# Patient Record
Sex: FEMALE | Race: WHITE | NOT HISPANIC OR LATINO | Employment: OTHER | URBAN - METROPOLITAN AREA
[De-identification: names, ages, dates, MRNs, and addresses within clinical notes are randomized per-mention and may not be internally consistent; named-entity substitution may affect disease eponyms.]

---

## 2017-02-10 ENCOUNTER — OFFICE VISIT (OUTPATIENT)
Dept: FAMILY MEDICINE | Facility: CLINIC | Age: 82
End: 2017-02-10
Payer: MEDICARE

## 2017-02-10 VITALS
HEIGHT: 59 IN | SYSTOLIC BLOOD PRESSURE: 132 MMHG | WEIGHT: 115.75 LBS | HEART RATE: 66 BPM | OXYGEN SATURATION: 96 % | BODY MASS INDEX: 23.33 KG/M2 | TEMPERATURE: 98 F | DIASTOLIC BLOOD PRESSURE: 74 MMHG

## 2017-02-10 DIAGNOSIS — R05.9 COUGH: Primary | ICD-10-CM

## 2017-02-10 DIAGNOSIS — J06.9 UPPER RESPIRATORY TRACT INFECTION, UNSPECIFIED TYPE: ICD-10-CM

## 2017-02-10 PROCEDURE — 96372 THER/PROPH/DIAG INJ SC/IM: CPT | Mod: S$GLB,,, | Performed by: NURSE PRACTITIONER

## 2017-02-10 PROCEDURE — 99213 OFFICE O/P EST LOW 20 MIN: CPT | Mod: 25,S$GLB,, | Performed by: NURSE PRACTITIONER

## 2017-02-10 RX ORDER — TRIAMCINOLONE ACETONIDE 40 MG/ML
80 INJECTION, SUSPENSION INTRA-ARTICULAR; INTRAMUSCULAR
Status: COMPLETED | OUTPATIENT
Start: 2017-02-10 | End: 2017-02-10

## 2017-02-10 RX ORDER — BENZONATATE 200 MG/1
200 CAPSULE ORAL 3 TIMES DAILY PRN
Qty: 30 CAPSULE | Refills: 0 | Status: SHIPPED | OUTPATIENT
Start: 2017-02-10 | End: 2017-02-20

## 2017-02-10 RX ADMIN — TRIAMCINOLONE ACETONIDE 80 MG: 40 INJECTION, SUSPENSION INTRA-ARTICULAR; INTRAMUSCULAR at 04:02

## 2017-02-10 NOTE — MR AVS SNAPSHOT
[facility]  [address] [place] [address]  Phone: [phone]  Fax: [phone]                  Colleen Bethea   2/10/2017 3:00 PM   Office Visit    Description:  Female : 1932   Provider:  Deloris Gaston NP   Department:  [facility]           Reason for Visit     Cough           Diagnoses this Visit        Comments    Cough    -  Primary     Upper respiratory tract infection, unspecified type                To Do List           Goals (5 Years of Data)     None       These Medications        Disp Refills Start End    benzonatate (TESSALON) 200 MG capsule 30 capsule 0 2/10/2017 2017    Take 1 capsule (200 mg total) by mouth 3 (three) times daily as needed for Cough. - Oral    Pharmacy: [facility]/pharmacy #[id number] - [address] AT [facility] #: [phone]         [facility]     [facility] On Call Nurse [facility] -  Assistance  Registered nurses in the [facility] On Call Center provide clinical advisement, health education, appointment booking, and other advisory services.  Call for this free service at [phone].             Medications           Message regarding Medications     Verify the changes and/or additions to your medication regime listed below are the same as discussed with your clinician today.  If any of these changes or additions are incorrect, please notify your healthcare provider.        START taking these NEW medications        Refills    benzonatate (TESSALON) 200 MG capsule 0    Sig: Take 1 capsule (200 mg total) by mouth 3 (three) times daily as needed for Cough.    Class: Normal    Route: Oral      These medications were administered today        Dose Freq    triamcinolone acetonide injection 80 mg 80 mg Clinic/HOD 1 time    Sig: Inject 2 mLs (80 mg total) into the muscle one time.    Class: Normal    Route: Intramuscular           Verify that the below list of medications is an  "accurate representation of the medications you are currently taking.  If none reported, the list may be blank. If incorrect, please contact your healthcare provider. Carry this list with you in case of emergency.           Current Medications     alendronate (FOSAMAX) 70 MG tablet Take 1 tablet (70 mg total) by mouth every 7 days.    apixaban (ELIQUIS) 5 mg Tab Take by mouth 2 (two) times daily.    losartan (COZAAR) 25 MG tablet TAKE 1 TABLET (25 MG TOTAL) BY MOUTH ONCE DAILY.    methocarbamol (ROBAXIN) 500 MG Tab TAKE 1 TABLET (500 MG TOTAL) BY MOUTH 2 (TWO) TIMES DAILY AS NEEDED (MUSCLE PAIN).    metoprolol tartrate (LOPRESSOR) 50 MG tablet Take 50 mg by mouth 2 (two) times daily.    benzonatate (TESSALON) 200 MG capsule Take 1 capsule (200 mg total) by mouth 3 (three) times daily as needed for Cough.           Clinical Reference Information           Your Vitals Were     BP Pulse Temp Height Weight SpO2    132/74 66 98.3 °F (36.8 °C) (Oral) 4' 11" (1.499 m) 52.5 kg (115 lb 11.9 oz) 96%    BMI                23.38 kg/m2          Blood Pressure          Most Recent Value    BP  132/74      Allergies as of 2/10/2017     No Known Allergies      Immunizations Administered on Date of Encounter - 2/10/2017     None      [facility] Sign-Up     Activating your [facility] account is as easy as 1-2-3!     1) Visit [email or web address], select Sign Up Now, enter this activation code and your date of birth, then select Next.  [email or web address]  Expires: 3/27/2017  3:30 PM      2) Create a username and password to use when you visit [facility] in the future and select a security question in case you lose your password and select Next.    3) Enter your e-mail address and click Sign Up!    Additional Information  If you have questions, please e-mail [email or web address] or call [phone] to talk to our [facility] staff. Remember, [facility] is NOT to be used for urgent needs. For medical emergencies, dial 911.         Language " Assistance Services     ATTENTION: Language assistance services are available, free of charge. Please call [phone].      ATENCIÓN: Si abdifatahla fermin, tiene a cam disposición servicios gratuitos de asistencia lingüística. Llame al [phone].     CHÚ Ý: N?u b?n nói Ti?ng Vi?t, có các d?ch v? h? tr? ngôn ng? mi?n phí dành cho b?n. G?i s? [phone].         [facility] complies with applicable Federal civil rights laws and does not discriminate on the basis of race, color, national origin, age, disability, or sex.

## 2017-02-10 NOTE — PROGRESS NOTES
Subjective:       Patient ID: Colleen Bethea is a 84 y.o. female.    Chief Complaint: Cough    Cough   This is a new problem. The current episode started in the past 7 days. The problem has been unchanged. The problem occurs every few minutes. The cough is non-productive. Associated symptoms include nasal congestion and postnasal drip. Pertinent negatives include no chest pain, chills, ear congestion, ear pain, fever, headaches, heartburn, hemoptysis, myalgias, rash, rhinorrhea, sore throat, shortness of breath, sweats, weight loss or wheezing. Treatments tried: cough drops. The treatment provided mild relief. There is no history of asthma, bronchiectasis, bronchitis, COPD, emphysema, environmental allergies or pneumonia.     Review of Systems   Constitutional: Negative for chills, diaphoresis, fatigue, fever and weight loss.   HENT: Positive for postnasal drip. Negative for ear pain, nosebleeds, rhinorrhea, sore throat and voice change.    Respiratory: Positive for cough. Negative for hemoptysis, chest tightness, shortness of breath and wheezing.    Cardiovascular: Negative for chest pain, palpitations and leg swelling.   Gastrointestinal: Negative for heartburn.   Musculoskeletal: Negative for myalgias.   Skin: Negative for rash.   Allergic/Immunologic: Negative for environmental allergies.   Neurological: Negative for headaches.       Objective:      Physical Exam   Constitutional: She appears well-developed and well-nourished. No distress.   HENT:   Right Ear: Tympanic membrane and external ear normal.   Left Ear: Tympanic membrane and external ear normal.   Nose: No rhinorrhea. Right sinus exhibits no maxillary sinus tenderness and no frontal sinus tenderness. Left sinus exhibits no maxillary sinus tenderness and no frontal sinus tenderness.   Mouth/Throat: No oropharyngeal exudate, posterior oropharyngeal edema or posterior oropharyngeal erythema.   Cardiovascular: Normal rate, regular rhythm and normal  heart sounds.    Pulmonary/Chest: Effort normal and breath sounds normal.   Skin: Skin is warm and dry. She is not diaphoretic.   Psychiatric: She has a normal mood and affect. Her behavior is normal. Judgment and thought content normal.   Vitals reviewed.      Assessment:       1. Cough    2. Upper respiratory tract infection, unspecified type        Plan:       Cough    Upper respiratory tract infection, unspecified type    Other orders  -     triamcinolone acetonide injection 80 mg; Inject 2 mLs (80 mg total) into the muscle one time.  -     benzonatate (TESSALON) 200 MG capsule; Take 1 capsule (200 mg total) by mouth 3 (three) times daily as needed for Cough.  Dispense: 30 capsule; Refill: 0

## 2017-03-17 ENCOUNTER — TELEPHONE (OUTPATIENT)
Dept: FAMILY MEDICINE | Facility: CLINIC | Age: 82
End: 2017-03-17

## 2017-03-17 NOTE — TELEPHONE ENCOUNTER
Faxed medical clearance form received from dr pruett's office  Pt needs to rtc to see dr bucio for this clearance  Pt saw brayan 2/10/17 for a cold not clearance  i tried to call the pt to set up apt but no answer and unable to leave a message  i did fax this info back to dr pruett office notifying them that pt needs to rtc

## 2017-03-26 RX ORDER — LOSARTAN POTASSIUM 25 MG/1
TABLET ORAL
Qty: 30 TABLET | Refills: 11 | Status: SHIPPED | OUTPATIENT
Start: 2017-03-26 | End: 2018-03-22 | Stop reason: SDUPTHER

## 2017-05-19 ENCOUNTER — OFFICE VISIT (OUTPATIENT)
Dept: FAMILY MEDICINE | Facility: CLINIC | Age: 82
End: 2017-05-19
Payer: MEDICARE

## 2017-05-19 ENCOUNTER — TELEPHONE (OUTPATIENT)
Dept: FAMILY MEDICINE | Facility: CLINIC | Age: 82
End: 2017-05-19

## 2017-05-19 VITALS
HEIGHT: 59 IN | OXYGEN SATURATION: 98 % | TEMPERATURE: 97 F | WEIGHT: 115.94 LBS | HEART RATE: 66 BPM | DIASTOLIC BLOOD PRESSURE: 72 MMHG | BODY MASS INDEX: 23.37 KG/M2 | SYSTOLIC BLOOD PRESSURE: 128 MMHG

## 2017-05-19 DIAGNOSIS — Z00.00 ROUTINE ADULT HEALTH MAINTENANCE: ICD-10-CM

## 2017-05-19 DIAGNOSIS — R04.0 EPISTAXIS, RECURRENT: Primary | ICD-10-CM

## 2017-05-19 DIAGNOSIS — Z23 NEED FOR PNEUMOCOCCAL VACCINATION: ICD-10-CM

## 2017-05-19 DIAGNOSIS — Z86.73 HX-TIA (TRANSIENT ISCHEMIC ATTACK): ICD-10-CM

## 2017-05-19 PROCEDURE — 90670 PCV13 VACCINE IM: CPT | Mod: S$GLB,,, | Performed by: FAMILY MEDICINE

## 2017-05-19 PROCEDURE — 99212 OFFICE O/P EST SF 10 MIN: CPT | Mod: S$GLB,,, | Performed by: FAMILY MEDICINE

## 2017-05-19 PROCEDURE — G0009 ADMIN PNEUMOCOCCAL VACCINE: HCPCS | Mod: S$GLB,,, | Performed by: FAMILY MEDICINE

## 2017-05-19 NOTE — TELEPHONE ENCOUNTER
----- Message from Love Kovacs sent at 5/19/2017  9:23 AM CDT -----  Contact: Pt [phone]  Patient states she been having nose bleeds lately and doesn't know what's causing it. No headaches. Patient would like to see Dr. Victoria today.

## 2017-05-19 NOTE — MR AVS SNAPSHOT
[facility]  [address] [place] [address]  Phone: [phone]  Fax: [phone]                  Colleen Bethea   2017 1:20 PM   Office Visit    Description:  Female : 1932   Provider:  Michele Durán MD   Department:  [facility]           Reason for Visit     Epistaxis                To Do List           Goals (5 Years of Data)     None      [facility]     [facility] On Call Nurse Care Line -  Assistance  Unless otherwise directed by your provider, please contact [facility], our nurse care line that is available for  assistance.     Registered nurses in the [facility] On [facility] Center provide: appointment scheduling, clinical advisement, health education, and other advisory services.  Call: [phone] (toll free)               Medications           Message regarding Medications     Verify the changes and/or additions to your medication regime listed below are the same as discussed with your clinician today.  If any of these changes or additions are incorrect, please notify your healthcare provider.             Verify that the below list of medications is an accurate representation of the medications you are currently taking.  If none reported, the list may be blank. If incorrect, please contact your healthcare provider. Carry this list with you in case of emergency.           Current Medications     alendronate (FOSAMAX) 70 MG tablet Take 1 tablet (70 mg total) by mouth every 7 days.    apixaban (ELIQUIS) 5 mg Tab Take by mouth 2 (two) times daily.    losartan (COZAAR) 25 MG tablet TAKE 1 TABLET (25 MG TOTAL) BY MOUTH ONCE DAILY.    methocarbamol (ROBAXIN) 500 MG Tab TAKE 1 TABLET (500 MG TOTAL) BY MOUTH 2 (TWO) TIMES DAILY AS NEEDED (MUSCLE PAIN).    metoprolol tartrate (LOPRESSOR) 50 MG tablet Take 50 mg by mouth 2 (two) times daily.           Clinical Reference Information           Your Vitals Were     BP Pulse Temp Height  "Weight SpO2    128/72 66 97 °F (36.1 °C) (Oral) 4' 11" (1.499 m) 52.6 kg (115 lb 15.4 oz) 98%    BMI                23.42 kg/m2          Blood Pressure          Most Recent Value    BP  128/72      Allergies as of 5/19/2017     No Known Allergies      Immunizations Administered on Date of Encounter - 5/19/2017     None      [facility] Sign-Up     Activating your [facility] account is as easy as 1-2-3!     1) Visit [email or web address], select Sign Up Now, enter this activation code and your date of birth, then select Next.  [email or web address]  Expires: 7/3/2017  1:56 PM      2) Create a username and password to use when you visit [facility] in the future and select a security question in case you lose your password and select Next.    3) Enter your e-mail address and click Sign Up!    Additional Information  If you have questions, please e-mail [email or web address] or call [phone] to talk to our [facility] staff. Remember, [facility] is NOT to be used for urgent needs. For medical emergencies, dial 911.         Instructions    Ocean or saline nasal spray once or twice a day in the right nostril to prevent nose bleeds         Language Assistance Services     ATTENTION: Language assistance services are available, free of charge. Please call [phone].      ATENCIÓN: Si habla español, tiene a cam disposición servicios gratuitos de asistencia lingüística. Llame al [phone].     CHÚ Ý: N?u b?n nói Ti?ng Vi?t, có các d?ch v? h? tr? ngôn ng? mi?n phí dành cho b?n. G?i s? [phone].         [facility] complies with applicable Federal civil rights laws and does not discriminate on the basis of race, color, national origin, age, disability, or sex.        "

## 2017-05-21 NOTE — PROGRESS NOTES
Patient ID: Colleen Bethea is a 85 y.o. female.    Chief Complaint: Epistaxis    HPI       Colleen Bethea is a 85 y.o. female complains of occasional nosebleeds lasting a few minutes only a few times a month.  No trauma to the area.  Does not report nasal congestion or chronic nasal problems.      Review of Symptoms    Constitutional  Neg activity change, No chills/ fever   Resp  Neg hemoptysis, stridor, choking  CVS  Neg chest pain, palpitations    Physical Exam    Constitutional:   Oriented to person, place, and time.appears well-developed and well-nourished.   No distress.     HENT:   Head: Normocephalic and atraumatic.     Right Ear: Tympanic membrane, external ear and ear canal normal     Left Ear: Tympanic membrane, external ear and ear canal normal    Nose: External Normal. Normal turbinates, No rhinorrhea no ulcerations or blood seen     Mouth/Throat: Uvula is midline, oropharynx is clear and moist and mucous membranes are normal.   No blood posterior pharynx    Neck: supple no anterior cervical adenopathy      Eyes:   Conjunctivae are normal. Right eye exhibits no discharge. Left eye exhibits no discharge. No scleral icterus. No periorbital edema     Musculoskeletal:  No edema. No obvious deformity No waisting     Neurological:   Alert and oriented to person, place, and time. Coordination normal.     Skin:   Skin is warm and dry.   No diaphoresis.   No rash noted.    Psychiatric: Normal mood and affect. Behavior is normal. Judgment and thought content normal.       Assessment / Plan:      ICD-10-CM ICD-9-CM   1. Epistaxis, recurrent R04.0 784.7   2. Routine adult health maintenance Z00.00 V70.0   3. Hx-TIA (transient ischemic attack) Z86.73 V12.54   4. Need for pneumococcal vaccination Z23 V03.82     Epistaxis, recurrent    Routine adult health maintenance    Hx-TIA (transient ischemic attack)    Need for pneumococcal vaccination    Other orders  -     Pneumococcal Conjugate Vaccine (13 Valent)  (IM)     probably due to dry mucosa and alkalosis    Suggested nasal saline spray daily

## 2017-06-26 DIAGNOSIS — M81.0 OSTEOPOROSIS: ICD-10-CM

## 2017-06-26 RX ORDER — ALENDRONATE SODIUM 70 MG/1
TABLET ORAL
Qty: 4 TABLET | Refills: 9 | Status: SHIPPED | OUTPATIENT
Start: 2017-06-26 | End: 2018-04-01 | Stop reason: SDUPTHER

## 2017-09-12 ENCOUNTER — HOSPITAL ENCOUNTER (EMERGENCY)
Facility: HOSPITAL | Age: 82
Discharge: HOME | End: 2017-09-12
Attending: FAMILY MEDICINE
Payer: MEDICARE

## 2017-09-12 VITALS
OXYGEN SATURATION: 99 % | TEMPERATURE: 99 F | WEIGHT: 130 LBS | HEART RATE: 73 BPM | BODY MASS INDEX: 23.04 KG/M2 | RESPIRATION RATE: 18 BRPM | SYSTOLIC BLOOD PRESSURE: 168 MMHG | HEIGHT: 63 IN | DIASTOLIC BLOOD PRESSURE: 71 MMHG

## 2017-09-12 DIAGNOSIS — S32.502A CLOSED FRACTURE OF LEFT PUBIS, UNSPECIFIED PORTION OF PUBIS, INITIAL ENCOUNTER: Primary | ICD-10-CM

## 2017-09-12 DIAGNOSIS — M25.559 HIP PAIN: ICD-10-CM

## 2017-09-12 PROCEDURE — 99283 EMERGENCY DEPT VISIT LOW MDM: CPT

## 2017-09-12 RX ORDER — TRAMADOL HYDROCHLORIDE 50 MG/1
50 TABLET ORAL
Status: DISCONTINUED | OUTPATIENT
Start: 2017-09-12 | End: 2017-09-12 | Stop reason: HOSPADM

## 2017-09-12 RX ORDER — TRAMADOL HYDROCHLORIDE 50 MG/1
50 TABLET ORAL EVERY 8 HOURS PRN
Qty: 30 TABLET | Refills: 0 | Status: SHIPPED | OUTPATIENT
Start: 2017-09-12 | End: 2017-09-22

## 2017-09-12 NOTE — ED PROVIDER NOTES
Encounter Date: 9/12/2017       History     Chief Complaint   Patient presents with    Hip Pain     Pt states fell 10 days ago at home, states is having pain to left hip.  Pt able to bear weight, walking with walker, pt noted dragging left leg.  + bruising and older skin tears/abrasions to left upper extremity.       Patient says that she slipped and fell onto her left side and sustaining injury to her left arm and left hip area about 10 days ago..  She received multiple skin tears on her left arm which have been healing.  And also large bruise on her left lateral thigh.  Patient complains of most pain in her left groin area.  Patient is able to bear weight and walk by a walker at home.  Patient has been taking only Tylenol for pain at home.      The history is provided by the patient.     Review of patient's allergies indicates:  No Known Allergies  Past Medical History:   Diagnosis Date    Breast cancer     Hypertension     TIA (transient ischemic attack)      Past Surgical History:   Procedure Laterality Date    BREAST SURGERY      HIP SURGERY       Family History   Problem Relation Age of Onset    No Known Problems Mother     No Known Problems Father      Social History   Substance Use Topics    Smoking status: Never Smoker    Smokeless tobacco: Never Used    Alcohol use Yes     Review of Systems   Constitutional: Negative for activity change, appetite change and fever.   HENT: Negative for congestion, ear pain, sinus pain and sore throat.    Eyes: Negative for pain and itching.   Respiratory: Negative for cough, chest tightness, shortness of breath and wheezing.    Cardiovascular: Negative for chest pain and palpitations.   Gastrointestinal: Negative for abdominal distention, abdominal pain, diarrhea, nausea and vomiting.   Genitourinary: Negative for dysuria, flank pain and frequency.   Musculoskeletal: Positive for gait problem. Negative for back pain and neck pain.   Skin: Negative for rash.    Neurological: Negative for dizziness, light-headedness, numbness and headaches.   Psychiatric/Behavioral: Negative for confusion, decreased concentration, dysphoric mood and hallucinations. The patient is not nervous/anxious.        Physical Exam     Initial Vitals [09/12/17 1140]   BP Pulse Resp Temp SpO2   (!) 184/81 75 18 98.6 °F (37 °C) 97 %      MAP       115.33         Physical Exam    Nursing note and vitals reviewed.  Constitutional: She appears well-developed and well-nourished.   HENT:   Head: Normocephalic.   Right Ear: External ear normal.   Left Ear: External ear normal.   Nose: Nose normal.   Mouth/Throat: Oropharynx is clear and moist.   Eyes: Conjunctivae and EOM are normal. Pupils are equal, round, and reactive to light.   Neck: Normal range of motion. Neck supple.   Cardiovascular: Normal rate, regular rhythm, normal heart sounds and intact distal pulses. Exam reveals no gallop and no friction rub.    No murmur heard.  Pulmonary/Chest: Breath sounds normal. No respiratory distress. She has no wheezes. She has no rhonchi. She has no rales. She exhibits no tenderness.   Abdominal: Soft. Bowel sounds are normal. She exhibits no distension. There is no tenderness.   Musculoskeletal:        Left hip: She exhibits tenderness.        Legs:  Neurological: She is alert and oriented to person, place, and time. She has normal strength and normal reflexes. She displays normal reflexes. No cranial nerve deficit.   Skin: Skin is warm. Capillary refill takes less than 2 seconds. Bruising noted.        Large bruising left lateral thigh.         ED Course   Procedures  Labs Reviewed - No data to display          Medical Decision Making:   ED Management:  Patient had left superior and inferior pubic rami.  Notified of patient and her son.  Requires some amount of pain medication and will have her see orthopedics as an outpatient.  Other:   I have discussed this case with another health care provider.       <>  Summary of the Discussion: Discussed with Dr. Saucedo who is on call for orthopedics.  As patient is active and is walking with the walker she can go home and follow-up with Dr. Saucedo at clinic for further physical therapy and management.                   ED Course      Clinical Impression:   The primary encounter diagnosis was Closed fracture of left pubis, unspecified portion of pubis, initial encounter. A diagnosis of Hip pain was also pertinent to this visit.    Disposition:   Disposition: Discharged  Condition: Sulma Purcell MD  09/15/17 0325

## 2017-10-02 ENCOUNTER — HOSPITAL ENCOUNTER (EMERGENCY)
Facility: HOSPITAL | Age: 82
Discharge: ANOTHER HOSPITAL | End: 2017-10-02
Attending: EMERGENCY MEDICINE
Payer: MEDICARE

## 2017-10-02 VITALS
OXYGEN SATURATION: 82 % | BODY MASS INDEX: 23 KG/M2 | TEMPERATURE: 99 F | RESPIRATION RATE: 19 BRPM | WEIGHT: 125 LBS | HEIGHT: 62 IN | SYSTOLIC BLOOD PRESSURE: 208 MMHG | DIASTOLIC BLOOD PRESSURE: 99 MMHG | HEART RATE: 105 BPM

## 2017-10-02 DIAGNOSIS — R07.9 CHEST PAIN WITH HIGH RISK FOR CARDIAC ETIOLOGY: Primary | ICD-10-CM

## 2017-10-02 DIAGNOSIS — I10 HYPERTENSION, UNSPECIFIED TYPE: ICD-10-CM

## 2017-10-02 DIAGNOSIS — I50.9 ACUTE DECOMPENSATED HEART FAILURE: ICD-10-CM

## 2017-10-02 DIAGNOSIS — Z79.01 CURRENT USE OF ANTICOAGULANT THERAPY: ICD-10-CM

## 2017-10-02 DIAGNOSIS — Z85.3 HISTORY OF BREAST CANCER: ICD-10-CM

## 2017-10-02 DIAGNOSIS — R07.9 CHEST PAIN: ICD-10-CM

## 2017-10-02 LAB
ALBUMIN SERPL BCP-MCNC: 3.5 G/DL
ALP SERPL-CCNC: 214 U/L
ALT SERPL W/O P-5'-P-CCNC: 20 U/L
ANION GAP SERPL CALC-SCNC: 8 MMOL/L
APTT BLDCRRT: 26.5 SEC
AST SERPL-CCNC: 35 U/L
BASOPHILS # BLD AUTO: 0.06 K/UL
BASOPHILS NFR BLD: 0.7 %
BILIRUB SERPL-MCNC: 1.4 MG/DL
BUN SERPL-MCNC: 13 MG/DL
CALCIUM SERPL-MCNC: 8.6 MG/DL
CHLORIDE SERPL-SCNC: 106 MMOL/L
CO2 SERPL-SCNC: 27 MMOL/L
CREAT SERPL-MCNC: 0.89 MG/DL
D DIMER PPP FEU-MCNC: 565 NG/ML
DIFFERENTIAL METHOD: ABNORMAL
EOSINOPHIL # BLD AUTO: 0.1 K/UL
EOSINOPHIL NFR BLD: 1.5 %
ERYTHROCYTE [DISTWIDTH] IN BLOOD BY AUTOMATED COUNT: 15.6 %
EST. GFR  (AFRICAN AMERICAN): >60 ML/MIN/1.73 M^2
EST. GFR  (NON AFRICAN AMERICAN): 59.3 ML/MIN/1.73 M^2
GLUCOSE SERPL-MCNC: 107 MG/DL
HCT VFR BLD AUTO: 36.3 %
HGB BLD-MCNC: 11.4 G/DL
INR PPP: 1.7
LYMPHOCYTES # BLD AUTO: 0.9 K/UL
LYMPHOCYTES NFR BLD: 9.8 %
MAGNESIUM SERPL-MCNC: 2 MG/DL
MCH RBC QN AUTO: 31.4 PG
MCHC RBC AUTO-ENTMCNC: 31.4 G/DL
MCV RBC AUTO: 100 FL
MONOCYTES # BLD AUTO: 1.1 K/UL
MONOCYTES NFR BLD: 12.1 %
NEUTROPHILS # BLD AUTO: 6.7 K/UL
NEUTROPHILS NFR BLD: 75.5 %
NT-PROBNP: 3600 PG/ML
PLATELET # BLD AUTO: 237 K/UL
PMV BLD AUTO: 10.3 FL
POTASSIUM SERPL-SCNC: 4.1 MMOL/L
PROT SERPL-MCNC: 6.6 G/DL
PROTHROMBIN TIME: 18.6 SEC
RBC # BLD AUTO: 3.63 M/UL
SODIUM SERPL-SCNC: 141 MMOL/L
TROPONIN I SERPL DL<=0.01 NG/ML-MCNC: 0.02 NG/ML
WBC # BLD AUTO: 8.89 K/UL

## 2017-10-02 PROCEDURE — 93005 ELECTROCARDIOGRAM TRACING: CPT

## 2017-10-02 PROCEDURE — 84484 ASSAY OF TROPONIN QUANT: CPT

## 2017-10-02 PROCEDURE — 80053 COMPREHEN METABOLIC PANEL: CPT

## 2017-10-02 PROCEDURE — 85610 PROTHROMBIN TIME: CPT

## 2017-10-02 PROCEDURE — 25000003 PHARM REV CODE 250: Performed by: EMERGENCY MEDICINE

## 2017-10-02 PROCEDURE — 85730 THROMBOPLASTIN TIME PARTIAL: CPT

## 2017-10-02 PROCEDURE — 27000221 HC OXYGEN, UP TO 24 HOURS

## 2017-10-02 PROCEDURE — 96375 TX/PRO/DX INJ NEW DRUG ADDON: CPT

## 2017-10-02 PROCEDURE — 63600175 PHARM REV CODE 636 W HCPCS: Performed by: EMERGENCY MEDICINE

## 2017-10-02 PROCEDURE — 85025 COMPLETE CBC W/AUTO DIFF WBC: CPT

## 2017-10-02 PROCEDURE — 99285 EMERGENCY DEPT VISIT HI MDM: CPT | Mod: 25

## 2017-10-02 PROCEDURE — 83880 ASSAY OF NATRIURETIC PEPTIDE: CPT

## 2017-10-02 PROCEDURE — 83735 ASSAY OF MAGNESIUM: CPT

## 2017-10-02 PROCEDURE — 96374 THER/PROPH/DIAG INJ IV PUSH: CPT

## 2017-10-02 PROCEDURE — 94760 N-INVAS EAR/PLS OXIMETRY 1: CPT

## 2017-10-02 PROCEDURE — 85379 FIBRIN DEGRADATION QUANT: CPT

## 2017-10-02 RX ORDER — HYDRALAZINE HYDROCHLORIDE 20 MG/ML
10 INJECTION INTRAMUSCULAR; INTRAVENOUS
Status: COMPLETED | OUTPATIENT
Start: 2017-10-02 | End: 2017-10-02

## 2017-10-02 RX ORDER — FUROSEMIDE 10 MG/ML
40 INJECTION INTRAMUSCULAR; INTRAVENOUS
Status: COMPLETED | OUTPATIENT
Start: 2017-10-02 | End: 2017-10-02

## 2017-10-02 RX ORDER — ASPIRIN 325 MG
325 TABLET ORAL ONCE
Status: COMPLETED | OUTPATIENT
Start: 2017-10-02 | End: 2017-10-02

## 2017-10-02 RX ADMIN — HYDRALAZINE HYDROCHLORIDE: 20 INJECTION INTRAMUSCULAR; INTRAVENOUS at 05:10

## 2017-10-02 RX ADMIN — FUROSEMIDE 40 MG: 10 INJECTION, SOLUTION INTRAMUSCULAR; INTRAVENOUS at 02:10

## 2017-10-02 RX ADMIN — NITROGLYCERIN 1 INCH: 20 OINTMENT TOPICAL at 01:10

## 2017-10-02 RX ADMIN — ASPIRIN 325 MG ORAL TABLET 325 MG: 325 PILL ORAL at 01:10

## 2017-10-02 NOTE — ED PROVIDER NOTES
"Encounter Date: 10/2/2017       History     Chief Complaint   Patient presents with    Chest Pain     "I started feeling chest pressure last night and it is still there today" denies SOB.      This patient is an 85-year-old female.  No prior history of coronary artery disease, but she does have a history of atrial fibrillation.  Presents to the emergency department complaining of chest pain.  Left precordial, radiates into the left axilla.  She had an episode yesterday evening, told her daughter that she was having pain, but neither the patient or the daughter can recall how long it lasted or if she had any associated symptoms.  The daughter said that she remembers she mentioned chest pain twice, but does not remember over what period of time.    This morning at about 10:30 the patient was at home sitting down watching TV when the pain recurred, more severe, this time with mild shortness of breath.  Has been constant since onset, but has waned a little bit, was severe at home, now she said it is moderate.  No associated palpitations or diaphoresis.  She has never experienced this type of chest pain before.    Coronary artery disease risk factors:  Positive:   Advanced age  Hypertension  Hyperlipidemia  History of cerebrovascular disease/TIA 2 years ago    Negative:  Tobacco use  Diabetes          Review of patient's allergies indicates:  No Known Allergies  Past Medical History:   Diagnosis Date    Breast cancer     Hypertension     TIA (transient ischemic attack)      Past Surgical History:   Procedure Laterality Date    BREAST SURGERY      HIP SURGERY       Family History   Problem Relation Age of Onset    No Known Problems Mother     No Known Problems Father      Social History   Substance Use Topics    Smoking status: Never Smoker    Smokeless tobacco: Never Used    Alcohol use Yes     Review of Systems   Constitutional: Negative for chills and fever.   HENT: Negative for congestion and rhinorrhea.  "   Respiratory: Positive for shortness of breath. Negative for cough.    Cardiovascular: Positive for chest pain and leg swelling. Negative for palpitations.        History of atrial fibrillation   Gastrointestinal: Negative for abdominal pain, nausea and vomiting.   Endocrine:        No history of diabetes   Genitourinary: Negative for difficulty urinating.   Skin: Positive for wound.        Has developed a bulla on the right pretibial surface   Hematological: Bruises/bleeds easily (takes Eliquis for stroke prophylaxis A. fib).        Previous history of breast cancer, left mastectomy   All other systems reviewed and are negative.      Physical Exam     Initial Vitals [10/02/17 1143]   BP Pulse Resp Temp SpO2   -- 96 17 98.2 °F (36.8 °C) 95 %      MAP       --         Physical Exam    Nursing note and vitals reviewed.  Constitutional: She appears well-developed. She is not diaphoretic. No distress.   Frail elderly female in no apparent distress   HENT:   Head: Normocephalic and atraumatic.   Right Ear: External ear normal.   Left Ear: External ear normal.   Mouth/Throat: Oropharynx is clear and moist.   Eyes: Conjunctivae and EOM are normal. Pupils are equal, round, and reactive to light.   Neck: JVD present.   Cardiovascular: Normal rate, normal heart sounds and intact distal pulses.   No murmur heard.  Irregular   Pulmonary/Chest: Breath sounds normal. No stridor. No respiratory distress. She has no wheezes.   Abdominal: Soft. She exhibits no distension. There is no tenderness.   Neurological: She is alert.   Ambulates with a walker  Equal  strength  Sensation intact all 4 extremities   Skin: Skin is warm and dry.   Psychiatric: She has a normal mood and affect.         ED Course   Critical Care  Date/Time: 10/2/2017 3:53 PM  Performed by: BRADEN SHELDON.  Authorized by: BRADEN SHELDON   Total critical care time (exclusive of procedural time) : 40 minutes        Labs Reviewed   CBC W/ AUTO  DIFFERENTIAL - Abnormal; Notable for the following:        Result Value    RBC 3.63 (*)     Hemoglobin 11.4 (*)     Hematocrit 36.3 (*)      (*)     MCH 31.4 (*)     MCHC 31.4 (*)     RDW 15.6 (*)     Lymph # 0.9 (*)     Mono # 1.1 (*)     Gran% 75.5 (*)     Lymph% 9.8 (*)     All other components within normal limits   COMPREHENSIVE METABOLIC PANEL - Abnormal; Notable for the following:     Calcium 8.6 (*)     Total Bilirubin 1.4 (*)     Alkaline Phosphatase 214 (*)     eGFR if non  59.3 (*)     All other components within normal limits   PROTIME-INR - Abnormal; Notable for the following:     Prothrombin Time 18.6 (*)     INR 1.7 (*)     All other components within normal limits   NT-PRO NATRIURETIC PEPTIDE - Abnormal; Notable for the following:     NT-proBNP 3600 (*)     All other components within normal limits   APTT   MAGNESIUM   TROPONIN I   D DIMER     EKG Readings: (Independently Interpreted)   I independently reviewed the EKG tracing.  It shows atrial fibrillation, ventricular response rate is controlled, 87.  There is inferior T-wave flattening       X-Rays:   Independently Interpreted Readings:   Other Readings:  I independently reviewed the chest x-ray.  There is cardiomegaly, diffuse increased interstitial markings, and bilateral plural effusions, the right looks a little bit larger than the left    Medical Decision Making:   Initial Assessment:   This patient presents to the emergency department with new onset chest pain.  She does have multiple risk factors for coronary artery disease, so she will need serial cardiac enzymes, telemetry monitoring, and evaluation by cardiology or a nuclear medicine stress test.    She does not have a prior history of congestive heart failure.  In our records, we do not have a previous echocardiogram.  Today on chest x-ray she does have cardiomegaly and evidence of decompensation.  She was placed on oxygen, aspirin, transdermal nitroglycerin, and  Lasix.  She was not given Lovenox because she is currently anticoagulated, because she has a history of chronic atrial fibrillation.    The patient requested transfer to [facility], where cardiologist practices.  I discussed her case with Dr. Leija, via the transfer center, and she has accepted her in transfer    17:04 - the patient's blood pressure has remained elevated despite the transdermal nitroglycerin and IV Lasix.  Systolic remains above 200.  I will give her a dose of hydralazine before transfer.                     ED Course      Clinical Impression:   The primary encounter diagnosis was Chest pain with high risk for cardiac etiology. Diagnoses of Chest pain, Acute decompensated heart failure, Hypertension, unspecified type, History of breast cancer, and Current use of anticoagulant therapy were also pertinent to this visit.    Disposition:   Disposition: Transferred  Condition: Stable                        Jassi Wang MD  10/02/17 1554       Jassi Wang MD  10/02/17 0439

## 2017-10-02 NOTE — ED NOTES
Pt accepted by Dr Eva Gongora at [facility]. Room # 524. Call report to [phone].

## 2017-12-11 ENCOUNTER — OFFICE VISIT (OUTPATIENT)
Dept: FAMILY MEDICINE | Facility: CLINIC | Age: 82
End: 2017-12-11
Payer: MEDICARE

## 2017-12-11 VITALS
WEIGHT: 101.63 LBS | TEMPERATURE: 98 F | BODY MASS INDEX: 18.58 KG/M2 | DIASTOLIC BLOOD PRESSURE: 72 MMHG | OXYGEN SATURATION: 99 % | RESPIRATION RATE: 15 BRPM | SYSTOLIC BLOOD PRESSURE: 110 MMHG | HEART RATE: 72 BPM

## 2017-12-11 DIAGNOSIS — N39.0 URINARY TRACT INFECTION WITHOUT HEMATURIA, SITE UNSPECIFIED: Primary | ICD-10-CM

## 2017-12-11 DIAGNOSIS — R30.0 DYSURIA: ICD-10-CM

## 2017-12-11 LAB
BILIRUB SERPL-MCNC: NORMAL MG/DL
BLOOD URINE, POC: NORMAL
COLOR, POC UA: YELLOW
GLUCOSE UR QL STRIP: NORMAL
KETONES UR QL STRIP: NORMAL
LEUKOCYTE ESTERASE URINE, POC: NORMAL
NITRITE, POC UA: NORMAL
PH, POC UA: 5
PROTEIN, POC: NORMAL
SPECIFIC GRAVITY, POC UA: 1.01
UROBILINOGEN, POC UA: NORMAL

## 2017-12-11 PROCEDURE — 99213 OFFICE O/P EST LOW 20 MIN: CPT | Mod: 25,S$GLB,, | Performed by: NURSE PRACTITIONER

## 2017-12-11 PROCEDURE — 81002 URINALYSIS NONAUTO W/O SCOPE: CPT | Mod: S$GLB,,, | Performed by: NURSE PRACTITIONER

## 2017-12-11 RX ORDER — NITROFURANTOIN (MACROCRYSTALS) 100 MG/1
100 CAPSULE ORAL 4 TIMES DAILY
Qty: 10 CAPSULE | Refills: 0 | Status: ON HOLD | OUTPATIENT
Start: 2017-12-11 | End: 2020-07-11 | Stop reason: HOSPADM

## 2017-12-11 NOTE — PROGRESS NOTES
Subjective:       Patient ID: Colleen Bethea is a 85 y.o. female.    Chief Complaint: Urinary Tract Infection    Urinary Tract Infection    This is a new problem. The current episode started in the past 7 days. The problem occurs every urination. The problem has been unchanged. The quality of the pain is described as burning. The fever has been present for less than 1 day. Associated symptoms include frequency and urgency. Pertinent negatives include no behavior changes, chills, discharge, flank pain, hematuria, hesitancy, nausea, possible pregnancy, sweats, vomiting, weight loss, bubble bath use, constipation, rash or withholding. She has tried nothing for the symptoms.     Review of Systems   Constitutional: Negative for chills, diaphoresis, fatigue, fever and weight loss.   Eyes: Negative for photophobia and visual disturbance.   Respiratory: Negative for cough, chest tightness and wheezing.    Cardiovascular: Negative for chest pain, palpitations and leg swelling.   Gastrointestinal: Negative for constipation, nausea and vomiting.   Genitourinary: Positive for dysuria, frequency and urgency. Negative for flank pain, hematuria and hesitancy.   Skin: Negative for rash.       Objective:      Physical Exam   Constitutional: She is oriented to person, place, and time. She appears well-developed and well-nourished. No distress.   HENT:   Right Ear: External ear normal.   Left Ear: External ear normal.   Cardiovascular: Normal rate, regular rhythm and normal heart sounds.    Pulmonary/Chest: Effort normal and breath sounds normal. No respiratory distress. She has no wheezes.   Neurological: She is alert and oriented to person, place, and time.   Skin: Skin is warm and dry. No rash noted. She is not diaphoretic. No erythema. No pallor.   Psychiatric: She has a normal mood and affect. Her behavior is normal. Judgment and thought content normal.   Vitals reviewed.      Assessment:       1. Urinary tract infection  without hematuria, site unspecified    2. Dysuria        Plan:       Urinary tract infection without hematuria, site unspecified  -     nitrofurantoin (MACRODANTIN) 100 MG capsule; Take 1 capsule (100 mg total) by mouth 4 (four) times daily.  Dispense: 10 capsule; Refill: 0    Dysuria  -     POCT URINE DIPSTICK WITHOUT MICROSCOPE    hydrate well with water

## 2018-03-22 RX ORDER — LOSARTAN POTASSIUM 25 MG/1
TABLET ORAL
Qty: 30 TABLET | Refills: 11 | Status: SHIPPED | OUTPATIENT
Start: 2018-03-22 | End: 2019-04-25 | Stop reason: SDUPTHER

## 2018-04-01 DIAGNOSIS — M81.0 OSTEOPOROSIS: ICD-10-CM

## 2018-04-02 RX ORDER — ALENDRONATE SODIUM 70 MG/1
TABLET ORAL
Qty: 4 TABLET | Refills: 9 | Status: SHIPPED | OUTPATIENT
Start: 2018-04-02 | End: 2019-04-30 | Stop reason: SDUPTHER

## 2018-10-16 ENCOUNTER — CLINICAL SUPPORT (OUTPATIENT)
Dept: FAMILY MEDICINE | Facility: CLINIC | Age: 83
End: 2018-10-16
Payer: COMMERCIAL

## 2018-10-16 DIAGNOSIS — Z23 NEED FOR INFLUENZA VACCINATION: Primary | ICD-10-CM

## 2018-10-16 PROCEDURE — G0008 ADMIN INFLUENZA VIRUS VAC: HCPCS | Mod: S$GLB,,, | Performed by: FAMILY MEDICINE

## 2018-10-16 PROCEDURE — 90662 IIV NO PRSV INCREASED AG IM: CPT | Mod: S$GLB,,, | Performed by: FAMILY MEDICINE

## 2018-10-23 ENCOUNTER — TELEPHONE (OUTPATIENT)
Dept: FAMILY MEDICINE | Facility: CLINIC | Age: 83
End: 2018-10-23

## 2018-10-23 NOTE — TELEPHONE ENCOUNTER
----- Message from Phylicia Freeman sent at 10/23/2018 11:43 AM CDT -----  Contact: Self/ [phone]  Patient asked to speak with you directly. She would like to get your personal opinion on her medical insurance. Patient would like to know if a 3rd insurance is needed.    Please call.

## 2018-10-23 NOTE — TELEPHONE ENCOUNTER
Wanted to know if she should cancel [employer] because she has [employer] as a secondary through shall Medicare is the primary.  She states that [employer]  Has never pay for anything.      Suggested she make sure this united plan from [place] will cover what Medicare does not and also make sure her children are involved

## 2019-04-25 RX ORDER — LOSARTAN POTASSIUM 25 MG/1
TABLET ORAL
Qty: 30 TABLET | Refills: 11 | Status: SHIPPED | OUTPATIENT
Start: 2019-04-25 | End: 2019-06-20

## 2019-04-30 DIAGNOSIS — M81.0 OSTEOPOROSIS: ICD-10-CM

## 2019-04-30 RX ORDER — ALENDRONATE SODIUM 70 MG/1
70 TABLET ORAL
Qty: 4 TABLET | Refills: 9 | Status: SHIPPED | OUTPATIENT
Start: 2019-04-30 | End: 2020-05-05 | Stop reason: SDUPTHER

## 2019-06-20 RX ORDER — LOSARTAN POTASSIUM 25 MG/1
25 TABLET ORAL DAILY
Qty: 90 TABLET | Refills: 3 | Status: SHIPPED | OUTPATIENT
Start: 2019-06-20 | End: 2019-07-19 | Stop reason: SDUPTHER

## 2019-06-20 NOTE — TELEPHONE ENCOUNTER
----- Message from Rosalinda Luong sent at 6/20/2019 11:22 AM CDT -----  Patient is requesting a medication refill.     RX name: losartan (COZAAR) 25 MG tablet  Strength:   Quantity: 90 day supply with refills   Directions: TAKE 1 TABLET (25 MG TOTAL) BY MOUTH ONCE DAILY    Pharmacy name: [facility] [place] mail order       Phone number where patient can be reached:

## 2019-07-19 RX ORDER — LOSARTAN POTASSIUM 25 MG/1
25 TABLET ORAL DAILY
Qty: 90 TABLET | Refills: 3 | Status: SHIPPED | OUTPATIENT
Start: 2019-07-19 | End: 2020-05-05

## 2019-07-24 ENCOUNTER — PES CALL (OUTPATIENT)
Dept: ADMINISTRATIVE | Facility: CLINIC | Age: 84
End: 2019-07-24

## 2019-07-31 ENCOUNTER — PES CALL (OUTPATIENT)
Dept: ADMINISTRATIVE | Facility: CLINIC | Age: 84
End: 2019-07-31

## 2020-05-05 DIAGNOSIS — M81.0 OSTEOPOROSIS: ICD-10-CM

## 2020-05-05 RX ORDER — LOSARTAN POTASSIUM 25 MG/1
TABLET ORAL
Qty: 30 TABLET | Refills: 3 | Status: SHIPPED | OUTPATIENT
Start: 2020-05-05

## 2020-05-05 RX ORDER — ALENDRONATE SODIUM 70 MG/1
70 TABLET ORAL
Qty: 4 TABLET | Refills: 9 | Status: SHIPPED | OUTPATIENT
Start: 2020-05-05

## 2020-05-05 NOTE — TELEPHONE ENCOUNTER
----- Message from Laney Muhammad sent at 5/5/2020  9:53 AM CDT -----  Contact: [phone]/self  Type:  RX Refill Request    Who Called:  self  Refill or New Rx: refill  RX Name and Strength: alendronate (FOSAMAX) 70 MG tablet  How is the patient currently taking it? (ex. 1XDay): Take 1 tablet (70 mg total) by mouth every 7 days. - Oral  Is this a 30 day or 90 day RX:   Preferred Pharmacy with phone number: [id number] [address] [facility]  Local or Mail Order:   Ordering Provider:   Would the patient rather a call back or a response via [facility]? Call   Best Call Back Number: [phone]/self  Additional Information:

## 2020-07-09 ENCOUNTER — HOSPITAL ENCOUNTER (INPATIENT)
Facility: HOSPITAL | Age: 85
LOS: 2 days | Discharge: HOME HEALTH | DRG: 189 | End: 2020-07-11
Attending: EMERGENCY MEDICINE | Admitting: HOSPITALIST
Payer: MEDICARE

## 2020-07-09 DIAGNOSIS — S00.03XA SCALP HEMATOMA, INITIAL ENCOUNTER: ICD-10-CM

## 2020-07-09 DIAGNOSIS — S09.90XA HEAD INJURY WITHOUT SKULL FRACTURE, INITIAL ENCOUNTER: ICD-10-CM

## 2020-07-09 DIAGNOSIS — R07.9 CHEST PAIN: ICD-10-CM

## 2020-07-09 DIAGNOSIS — I50.23 ACUTE ON CHRONIC SYSTOLIC CONGESTIVE HEART FAILURE: ICD-10-CM

## 2020-07-09 DIAGNOSIS — R09.02 HYPOXIC: ICD-10-CM

## 2020-07-09 DIAGNOSIS — I50.9 CHF (CONGESTIVE HEART FAILURE): ICD-10-CM

## 2020-07-09 DIAGNOSIS — R79.89 ELEVATED TROPONIN: ICD-10-CM

## 2020-07-09 DIAGNOSIS — J96.01 ACUTE RESPIRATORY FAILURE WITH HYPOXIA: Primary | ICD-10-CM

## 2020-07-09 DIAGNOSIS — J18.9 PNEUMONIA OF RIGHT LUNG DUE TO INFECTIOUS ORGANISM, UNSPECIFIED PART OF LUNG: ICD-10-CM

## 2020-07-09 DIAGNOSIS — S22.080A CLOSED WEDGE COMPRESSION FRACTURE OF ELEVENTH THORACIC VERTEBRA, INITIAL ENCOUNTER: ICD-10-CM

## 2020-07-09 LAB
ALBUMIN SERPL BCP-MCNC: 3.4 G/DL (ref 3.5–5.2)
ALP SERPL-CCNC: 126 U/L (ref 38–126)
ALT SERPL W/O P-5'-P-CCNC: 17 U/L (ref 10–44)
ANION GAP SERPL CALC-SCNC: 5 MMOL/L (ref 8–16)
AST SERPL-CCNC: 38 U/L (ref 15–46)
BASOPHILS # BLD AUTO: 0.05 K/UL (ref 0–0.2)
BASOPHILS NFR BLD: 0.7 % (ref 0–1.9)
BILIRUB SERPL-MCNC: 1.2 MG/DL (ref 0.1–1)
BUN SERPL-MCNC: 23 MG/DL (ref 7–17)
CALCIUM SERPL-MCNC: 8.4 MG/DL (ref 8.7–10.5)
CHLORIDE SERPL-SCNC: 98 MMOL/L (ref 95–110)
CO2 SERPL-SCNC: 36 MMOL/L (ref 23–29)
CREAT SERPL-MCNC: 1.09 MG/DL (ref 0.5–1.4)
DIFFERENTIAL METHOD: ABNORMAL
EOSINOPHIL # BLD AUTO: 0 K/UL (ref 0–0.5)
EOSINOPHIL NFR BLD: 0.5 % (ref 0–8)
ERYTHROCYTE [DISTWIDTH] IN BLOOD BY AUTOMATED COUNT: 15.8 % (ref 11.5–14.5)
EST. GFR  (AFRICAN AMERICAN): 52.4 ML/MIN/1.73 M^2
EST. GFR  (NON AFRICAN AMERICAN): 45.4 ML/MIN/1.73 M^2
GLUCOSE SERPL-MCNC: 125 MG/DL (ref 70–110)
HCT VFR BLD AUTO: 38.1 % (ref 37–48.5)
HGB BLD-MCNC: 11.9 G/DL (ref 12–16)
IMM GRANULOCYTES # BLD AUTO: 0.13 K/UL (ref 0–0.04)
IMM GRANULOCYTES NFR BLD AUTO: 1.7 % (ref 0–0.5)
INR PPP: 1.1 (ref 0.8–1.2)
LYMPHOCYTES # BLD AUTO: 0.6 K/UL (ref 1–4.8)
LYMPHOCYTES NFR BLD: 7.3 % (ref 18–48)
MCH RBC QN AUTO: 32.8 PG (ref 27–31)
MCHC RBC AUTO-ENTMCNC: 31.2 G/DL (ref 32–36)
MCV RBC AUTO: 105 FL (ref 82–98)
MONOCYTES # BLD AUTO: 1.3 K/UL (ref 0.3–1)
MONOCYTES NFR BLD: 17.2 % (ref 4–15)
NEUTROPHILS # BLD AUTO: 5.5 K/UL (ref 1.8–7.7)
NEUTROPHILS NFR BLD: 72.6 % (ref 38–73)
NRBC BLD-RTO: 0 /100 WBC
NT-PROBNP: 2480 PG/ML (ref 5–1800)
PLATELET # BLD AUTO: 169 K/UL (ref 150–350)
PMV BLD AUTO: 10.1 FL (ref 9.2–12.9)
POTASSIUM SERPL-SCNC: 3.2 MMOL/L (ref 3.5–5.1)
PROT SERPL-MCNC: 6.7 G/DL (ref 6–8.4)
PROTHROMBIN TIME: 11.9 SEC (ref 9–12.5)
RBC # BLD AUTO: 3.63 M/UL (ref 4–5.4)
SARS-COV-2 RDRP RESP QL NAA+PROBE: NEGATIVE
SODIUM SERPL-SCNC: 139 MMOL/L (ref 136–145)
TROPONIN I SERPL DL<=0.01 NG/ML-MCNC: 0.04 NG/ML (ref 0.01–0.03)
TROPONIN I SERPL DL<=0.01 NG/ML-MCNC: 0.05 NG/ML (ref 0.01–0.03)
WBC # BLD AUTO: 7.58 K/UL (ref 3.9–12.7)

## 2020-07-09 PROCEDURE — 84484 ASSAY OF TROPONIN QUANT: CPT | Mod: ER

## 2020-07-09 PROCEDURE — 63600175 PHARM REV CODE 636 W HCPCS: Mod: ER | Performed by: PHYSICIAN ASSISTANT

## 2020-07-09 PROCEDURE — 27000221 HC OXYGEN, UP TO 24 HOURS: Mod: ER

## 2020-07-09 PROCEDURE — 93010 EKG 12-LEAD: ICD-10-PCS | Mod: ,,, | Performed by: INTERNAL MEDICINE

## 2020-07-09 PROCEDURE — 85025 COMPLETE CBC W/AUTO DIFF WBC: CPT | Mod: ER

## 2020-07-09 PROCEDURE — 83880 ASSAY OF NATRIURETIC PEPTIDE: CPT | Mod: ER

## 2020-07-09 PROCEDURE — 90471 IMMUNIZATION ADMIN: CPT | Mod: ER | Performed by: PHYSICIAN ASSISTANT

## 2020-07-09 PROCEDURE — 25000003 PHARM REV CODE 250: Mod: ER | Performed by: PHYSICIAN ASSISTANT

## 2020-07-09 PROCEDURE — 80053 COMPREHEN METABOLIC PANEL: CPT | Mod: ER

## 2020-07-09 PROCEDURE — 90715 TDAP VACCINE 7 YRS/> IM: CPT | Mod: ER | Performed by: PHYSICIAN ASSISTANT

## 2020-07-09 PROCEDURE — 93010 ELECTROCARDIOGRAM REPORT: CPT | Mod: ,,, | Performed by: INTERNAL MEDICINE

## 2020-07-09 PROCEDURE — 83605 ASSAY OF LACTIC ACID: CPT | Mod: ER

## 2020-07-09 PROCEDURE — 94760 N-INVAS EAR/PLS OXIMETRY 1: CPT | Mod: ER

## 2020-07-09 PROCEDURE — 85610 PROTHROMBIN TIME: CPT | Mod: ER

## 2020-07-09 PROCEDURE — U0002 COVID-19 LAB TEST NON-CDC: HCPCS | Mod: ER

## 2020-07-09 PROCEDURE — 63600175 PHARM REV CODE 636 W HCPCS: Mod: ER | Performed by: EMERGENCY MEDICINE

## 2020-07-09 PROCEDURE — 11000001 HC ACUTE MED/SURG PRIVATE ROOM

## 2020-07-09 PROCEDURE — 93005 ELECTROCARDIOGRAM TRACING: CPT | Mod: ER

## 2020-07-09 PROCEDURE — 87040 BLOOD CULTURE FOR BACTERIA: CPT | Mod: ER

## 2020-07-09 RX ORDER — POTASSIUM CHLORIDE 20 MEQ/1
20 TABLET, EXTENDED RELEASE ORAL
Status: COMPLETED | OUTPATIENT
Start: 2020-07-09 | End: 2020-07-09

## 2020-07-09 RX ORDER — FUROSEMIDE 10 MG/ML
20 INJECTION INTRAMUSCULAR; INTRAVENOUS
Status: COMPLETED | OUTPATIENT
Start: 2020-07-09 | End: 2020-07-09

## 2020-07-09 RX ADMIN — CLOSTRIDIUM TETANI TOXOID ANTIGEN (FORMALDEHYDE INACTIVATED), CORYNEBACTERIUM DIPHTHERIAE TOXOID ANTIGEN (FORMALDEHYDE INACTIVATED), BORDETELLA PERTUSSIS TOXOID ANTIGEN (GLUTARALDEHYDE INACTIVATED), BORDETELLA PERTUSSIS FILAMENTOUS HEMAGGLUTININ ANTIGEN (FORMALDEHYDE INACTIVATED), BORDETELLA PERTUSSIS PERTACTIN ANTIGEN, AND BORDETELLA PERTUSSIS FIMBRIAE 2/3 ANTIGEN 0.5 ML: 5; 2; 2.5; 5; 3; 5 INJECTION, SUSPENSION INTRAMUSCULAR at 05:07

## 2020-07-09 RX ADMIN — FUROSEMIDE 20 MG: 10 INJECTION, SOLUTION INTRAVENOUS at 08:07

## 2020-07-09 RX ADMIN — CEFTRIAXONE 1 G: 1 INJECTION, SOLUTION INTRAVENOUS at 11:07

## 2020-07-09 RX ADMIN — POTASSIUM CHLORIDE 20 MEQ: 1500 TABLET, EXTENDED RELEASE ORAL at 08:07

## 2020-07-10 PROBLEM — G45.9 TIA (TRANSIENT ISCHEMIC ATTACK): Status: ACTIVE | Noted: 2020-07-10

## 2020-07-10 PROBLEM — I48.0 PAROXYSMAL ATRIAL FIBRILLATION: Status: ACTIVE | Noted: 2020-07-10

## 2020-07-10 PROBLEM — S22.000A COMPRESSION FRACTURE OF BODY OF THORACIC VERTEBRA: Status: ACTIVE | Noted: 2020-07-10

## 2020-07-10 PROBLEM — R29.6 RECURRENT FALLS: Status: ACTIVE | Noted: 2020-07-10

## 2020-07-10 PROBLEM — I50.23 ACUTE ON CHRONIC SYSTOLIC HEART FAILURE: Status: ACTIVE | Noted: 2020-07-10

## 2020-07-10 PROBLEM — J18.9 COMMUNITY ACQUIRED PNEUMONIA OF RIGHT LUNG: Status: ACTIVE | Noted: 2020-07-10

## 2020-07-10 PROBLEM — J90 PLEURAL EFFUSION: Status: ACTIVE | Noted: 2020-07-10

## 2020-07-10 PROBLEM — I10 ESSENTIAL HYPERTENSION: Status: ACTIVE | Noted: 2020-07-10

## 2020-07-10 LAB
ANION GAP SERPL CALC-SCNC: 11 MMOL/L (ref 8–16)
AORTIC ROOT ANNULUS: 2.69 CM
ASCENDING AORTA: 2.57 CM
AV INDEX (PROSTH): 1
AV MEAN GRADIENT: 4 MMHG
AV PEAK GRADIENT: 9 MMHG
AV REGURGITATION PRESSURE HALF TIME: 202 MS
AV VALVE AREA: 1.96 CM2
AV VELOCITY RATIO: 0.65
BASOPHILS # BLD AUTO: 0.04 K/UL (ref 0–0.2)
BASOPHILS NFR BLD: 0.4 % (ref 0–1.9)
BNP SERPL-MCNC: 704 PG/ML (ref 0–99)
BSA FOR ECHO PROCEDURE: 1.41 M2
BUN SERPL-MCNC: 17 MG/DL (ref 8–23)
CALCIUM SERPL-MCNC: 8 MG/DL (ref 8.7–10.5)
CHLORIDE SERPL-SCNC: 101 MMOL/L (ref 95–110)
CO2 SERPL-SCNC: 28 MMOL/L (ref 23–29)
CREAT SERPL-MCNC: 0.9 MG/DL (ref 0.5–1.4)
CV ECHO LV RWT: 0.5 CM
DIFFERENTIAL METHOD: ABNORMAL
DOP CALC AO PEAK VEL: 1.5 M/S
DOP CALC AO VTI: 22 CM
DOP CALC LVOT AREA: 2 CM2
DOP CALC LVOT DIAMETER: 1.58 CM
DOP CALC LVOT PEAK VEL: 0.97 M/S
DOP CALC LVOT STROKE VOLUME: 43.11 CM3
DOP CALCLVOT PEAK VEL VTI: 22 CM
E/E' RATIO: 20.1 M/S
ECHO LV POSTERIOR WALL: 0.89 CM (ref 0.6–1.1)
EOSINOPHIL # BLD AUTO: 0 K/UL (ref 0–0.5)
EOSINOPHIL NFR BLD: 0 % (ref 0–8)
ERYTHROCYTE [DISTWIDTH] IN BLOOD BY AUTOMATED COUNT: 15.8 % (ref 11.5–14.5)
EST. GFR  (AFRICAN AMERICAN): >60 ML/MIN/1.73 M^2
EST. GFR  (NON AFRICAN AMERICAN): 57 ML/MIN/1.73 M^2
FRACTIONAL SHORTENING: 33 % (ref 28–44)
GLUCOSE SERPL-MCNC: 138 MG/DL (ref 70–110)
HCT VFR BLD AUTO: 35.3 % (ref 37–48.5)
HGB BLD-MCNC: 11.2 G/DL (ref 12–16)
IMM GRANULOCYTES # BLD AUTO: 0.07 K/UL (ref 0–0.04)
IMM GRANULOCYTES NFR BLD AUTO: 0.7 % (ref 0–0.5)
INTERVENTRICULAR SEPTUM: 1 CM (ref 0.6–1.1)
LA MAJOR: 7.31 CM
LA MINOR: 7.41 CM
LA WIDTH: 4.63 CM
LACTATE SERPL-SCNC: 2 MMOL/L (ref 0.5–2.2)
LEFT ATRIUM SIZE: 4.96 CM
LEFT ATRIUM VOLUME INDEX: 101.6 ML/M2
LEFT ATRIUM VOLUME: 143.66 CM3
LEFT INTERNAL DIMENSION IN SYSTOLE: 2.4 CM (ref 2.1–4)
LEFT VENTRICLE DIASTOLIC VOLUME INDEX: 38.2 ML/M2
LEFT VENTRICLE DIASTOLIC VOLUME: 54 ML
LEFT VENTRICLE MASS INDEX: 70 G/M2
LEFT VENTRICLE SYSTOLIC VOLUME INDEX: 14.2 ML/M2
LEFT VENTRICLE SYSTOLIC VOLUME: 20.09 ML
LEFT VENTRICULAR INTERNAL DIMENSION IN DIASTOLE: 3.59 CM (ref 3.5–6)
LEFT VENTRICULAR MASS: 99.02 G
LV LATERAL E/E' RATIO: 15.46 M/S
LV SEPTAL E/E' RATIO: 28.71 M/S
LYMPHOCYTES # BLD AUTO: 0.5 K/UL (ref 1–4.8)
LYMPHOCYTES NFR BLD: 4.5 % (ref 18–48)
MAGNESIUM SERPL-MCNC: 1.7 MG/DL (ref 1.6–2.6)
MCH RBC QN AUTO: 33.5 PG (ref 27–31)
MCHC RBC AUTO-ENTMCNC: 31.7 G/DL (ref 32–36)
MCV RBC AUTO: 106 FL (ref 82–98)
MONOCYTES # BLD AUTO: 1.7 K/UL (ref 0.3–1)
MONOCYTES NFR BLD: 17.1 % (ref 4–15)
MV PEAK E VEL: 2.01 M/S
NEUTROPHILS # BLD AUTO: 7.7 K/UL (ref 1.8–7.7)
NEUTROPHILS NFR BLD: 77.3 % (ref 38–73)
NRBC BLD-RTO: 0 /100 WBC
PHOSPHATE SERPL-MCNC: 3.8 MG/DL (ref 2.7–4.5)
PISA TR MAX VEL: 3.88 M/S
PLATELET # BLD AUTO: 159 K/UL (ref 150–350)
PMV BLD AUTO: 10.8 FL (ref 9.2–12.9)
POTASSIUM SERPL-SCNC: 3.4 MMOL/L (ref 3.5–5.1)
RA MAJOR: 7.9 CM
RA PRESSURE: 15 MMHG
RA WIDTH: 5.32 CM
RBC # BLD AUTO: 3.34 M/UL (ref 4–5.4)
RIGHT VENTRICULAR END-DIASTOLIC DIMENSION: 2.82 CM
SODIUM SERPL-SCNC: 140 MMOL/L (ref 136–145)
STJ: 2.32 CM
TDI LATERAL: 0.13 M/S
TDI SEPTAL: 0.07 M/S
TDI: 0.1 M/S
TR MAX PG: 60 MMHG
TRICUSPID ANNULAR PLANE SYSTOLIC EXCURSION: 0.92 CM
TROPONIN I SERPL DL<=0.01 NG/ML-MCNC: 0.04 NG/ML (ref 0–0.03)
TV REST PULMONARY ARTERY PRESSURE: 75 MMHG
WBC # BLD AUTO: 9.93 K/UL (ref 3.9–12.7)

## 2020-07-10 PROCEDURE — 25000003 PHARM REV CODE 250: Performed by: NURSE PRACTITIONER

## 2020-07-10 PROCEDURE — 63600175 PHARM REV CODE 636 W HCPCS: Mod: ER | Performed by: EMERGENCY MEDICINE

## 2020-07-10 PROCEDURE — 99285 EMERGENCY DEPT VISIT HI MDM: CPT | Mod: 25

## 2020-07-10 PROCEDURE — 83880 ASSAY OF NATRIURETIC PEPTIDE: CPT

## 2020-07-10 PROCEDURE — 63600175 PHARM REV CODE 636 W HCPCS: Performed by: NURSE PRACTITIONER

## 2020-07-10 PROCEDURE — 85025 COMPLETE CBC W/AUTO DIFF WBC: CPT

## 2020-07-10 PROCEDURE — 96365 THER/PROPH/DIAG IV INF INIT: CPT

## 2020-07-10 PROCEDURE — 80048 BASIC METABOLIC PNL TOTAL CA: CPT

## 2020-07-10 PROCEDURE — 84484 ASSAY OF TROPONIN QUANT: CPT

## 2020-07-10 PROCEDURE — 84100 ASSAY OF PHOSPHORUS: CPT

## 2020-07-10 PROCEDURE — 63700000 PHARM REV CODE 250 ALT 637 W/O HCPCS: Performed by: NURSE PRACTITIONER

## 2020-07-10 PROCEDURE — 99900035 HC TECH TIME PER 15 MIN (STAT)

## 2020-07-10 PROCEDURE — 96375 TX/PRO/DX INJ NEW DRUG ADDON: CPT

## 2020-07-10 PROCEDURE — 96367 TX/PROPH/DG ADDL SEQ IV INF: CPT

## 2020-07-10 PROCEDURE — 83735 ASSAY OF MAGNESIUM: CPT

## 2020-07-10 PROCEDURE — 96376 TX/PRO/DX INJ SAME DRUG ADON: CPT

## 2020-07-10 PROCEDURE — 11000001 HC ACUTE MED/SURG PRIVATE ROOM

## 2020-07-10 PROCEDURE — 27000221 HC OXYGEN, UP TO 24 HOURS

## 2020-07-10 PROCEDURE — 96366 THER/PROPH/DIAG IV INF ADDON: CPT

## 2020-07-10 RX ORDER — IPRATROPIUM BROMIDE AND ALBUTEROL SULFATE 2.5; .5 MG/3ML; MG/3ML
3 SOLUTION RESPIRATORY (INHALATION) EVERY 4 HOURS PRN
Status: DISCONTINUED | OUTPATIENT
Start: 2020-07-10 | End: 2020-07-11 | Stop reason: HOSPADM

## 2020-07-10 RX ORDER — TALC
6 POWDER (GRAM) TOPICAL NIGHTLY PRN
Status: DISCONTINUED | OUTPATIENT
Start: 2020-07-10 | End: 2020-07-11 | Stop reason: HOSPADM

## 2020-07-10 RX ORDER — POTASSIUM CHLORIDE 20 MEQ/1
20 TABLET, EXTENDED RELEASE ORAL ONCE
Status: COMPLETED | OUTPATIENT
Start: 2020-07-10 | End: 2020-07-10

## 2020-07-10 RX ORDER — LOSARTAN POTASSIUM 25 MG/1
25 TABLET ORAL DAILY
Status: DISCONTINUED | OUTPATIENT
Start: 2020-07-10 | End: 2020-07-11 | Stop reason: HOSPADM

## 2020-07-10 RX ORDER — ONDANSETRON 2 MG/ML
4 INJECTION INTRAMUSCULAR; INTRAVENOUS EVERY 8 HOURS PRN
Status: DISCONTINUED | OUTPATIENT
Start: 2020-07-10 | End: 2020-07-11 | Stop reason: HOSPADM

## 2020-07-10 RX ORDER — AZITHROMYCIN 250 MG/1
500 TABLET, FILM COATED ORAL DAILY
Status: DISCONTINUED | OUTPATIENT
Start: 2020-07-10 | End: 2020-07-11 | Stop reason: HOSPADM

## 2020-07-10 RX ORDER — SODIUM CHLORIDE 0.9 % (FLUSH) 0.9 %
10 SYRINGE (ML) INJECTION
Status: DISCONTINUED | OUTPATIENT
Start: 2020-07-10 | End: 2020-07-11 | Stop reason: HOSPADM

## 2020-07-10 RX ORDER — ACETAMINOPHEN 325 MG/1
650 TABLET ORAL EVERY 4 HOURS PRN
Status: DISCONTINUED | OUTPATIENT
Start: 2020-07-10 | End: 2020-07-11 | Stop reason: HOSPADM

## 2020-07-10 RX ORDER — FUROSEMIDE 10 MG/ML
20 INJECTION INTRAMUSCULAR; INTRAVENOUS 2 TIMES DAILY
Status: DISCONTINUED | OUTPATIENT
Start: 2020-07-10 | End: 2020-07-11 | Stop reason: HOSPADM

## 2020-07-10 RX ADMIN — POTASSIUM CHLORIDE 20 MEQ: 1500 TABLET, EXTENDED RELEASE ORAL at 08:07

## 2020-07-10 RX ADMIN — APIXABAN 2.5 MG: 2.5 TABLET, FILM COATED ORAL at 08:07

## 2020-07-10 RX ADMIN — APIXABAN 2.5 MG: 2.5 TABLET, FILM COATED ORAL at 09:07

## 2020-07-10 RX ADMIN — CEFTRIAXONE 1 G: 1 INJECTION, SOLUTION INTRAVENOUS at 11:07

## 2020-07-10 RX ADMIN — AZITHROMYCIN MONOHYDRATE 500 MG: 250 TABLET ORAL at 09:07

## 2020-07-10 RX ADMIN — FUROSEMIDE 20 MG: 10 INJECTION, SOLUTION INTRAVENOUS at 09:07

## 2020-07-10 RX ADMIN — FUROSEMIDE 20 MG: 10 INJECTION, SOLUTION INTRAVENOUS at 08:07

## 2020-07-10 RX ADMIN — LOSARTAN POTASSIUM 25 MG: 25 TABLET, FILM COATED ORAL at 08:07

## 2020-07-10 RX ADMIN — AZITHROMYCIN MONOHYDRATE 500 MG: 500 INJECTION, POWDER, LYOPHILIZED, FOR SOLUTION INTRAVENOUS at 01:07

## 2020-07-10 RX ADMIN — Medication 6 MG: at 09:07

## 2020-07-10 NOTE — HPI
Colleen Childs is a 89 yo female with pmh of hypertension, TIA, chronic systolic heart failure, paroxysmal atrial fibrillation and breast CA who presented to [facility] ED s/p mechanical fall. Pt was getting up from chair without her cane when fell backwards striking her head. No LOC. She complains of back pain. No chest pain or shortness of breath, however she was found to be hypoxic in ED (O2 sats 87% room air on initial evaluation). Family denies home O2, reports underlying heart failure with 3 lb  weight gain over the past few days for which she increased her home lasix with noted improvement. Labs remarkable for potassium 3.2, pro-BNP 2480 and troponin 0.036. COVID 19 negative. CXR shows moderate volume right pleural effusion with adjacent airspace consolidation in the right lower and middle lobe suggesting compressive atelectasis and/or pneumonia with vascular congestion. CT head negative. CT thoracic spine shows acute compression fracture. She received Zithromax/Ceftriaxone and furosemide. Patient admitted to [facility] hospital medicine.

## 2020-07-10 NOTE — ASSESSMENT & PLAN NOTE
Acute on chronic   -Ct thoracic spine shows acute compression fx of T11 and T8   -continue supportive management   -consider ortho consult, consult PT

## 2020-07-10 NOTE — SUBJECTIVE & OBJECTIVE
Past Medical History:   Diagnosis Date    Breast cancer     Hypertension     TIA (transient ischemic attack)        Past Surgical History:   Procedure Laterality Date    BREAST SURGERY      HIP SURGERY         Review of patient's allergies indicates:  No Known Allergies    No current facility-administered medications on file prior to encounter.      Current Outpatient Medications on File Prior to Encounter   Medication Sig    alendronate (FOSAMAX) 70 MG tablet Take 1 tablet (70 mg total) by mouth every 7 days.    apixaban (ELIQUIS) 5 mg Tab Take by mouth 2 (two) times daily.    losartan (COZAAR) 25 MG tablet TAKE 1 TABLET BY MOUTH EVERY DAY    methocarbamol (ROBAXIN) 500 MG Tab TAKE 1 TABLET (500 MG TOTAL) BY MOUTH 2 (TWO) TIMES DAILY AS NEEDED (MUSCLE PAIN).    metoprolol tartrate (LOPRESSOR) 50 MG tablet Take 50 mg by mouth 2 (two) times daily.    nitrofurantoin (MACRODANTIN) 100 MG capsule Take 1 capsule (100 mg total) by mouth 4 (four) times daily.     Family History     Problem Relation (Age of Onset)    No Known Problems Mother, Father        Tobacco Use    Smoking status: Never Smoker    Smokeless tobacco: Never Used   Substance and Sexual Activity    Alcohol use: Yes    Drug use: No    Sexual activity: Not on file     Review of Systems   Constitutional: Negative for chills, diaphoresis and fever.   Eyes: Negative for photophobia.   Respiratory: Negative for cough, chest tightness, shortness of breath and wheezing.    Cardiovascular: Positive for leg swelling. Negative for chest pain and palpitations.   Gastrointestinal: Negative for abdominal pain, diarrhea, nausea and vomiting.   Genitourinary: Negative for dysuria, flank pain, frequency and hematuria.   Musculoskeletal: Negative for back pain and myalgias.   Skin: Positive for wound (scalp ).   Neurological: Positive for weakness. Negative for dizziness, syncope, light-headedness and headaches.   Psychiatric/Behavioral: Negative for  confusion.     Objective:     Vital Signs (Most Recent):  Temp: 98.4 °F (36.9 °C) (07/10/20 0307)  Pulse: 83 (07/10/20 0502)  Resp: (!) 32 (07/10/20 0502)  BP: 130/62 (07/10/20 0502)  SpO2: 99 % (07/10/20 0502) Vital Signs (24h Range):  Temp:  [97.9 °F (36.6 °C)-98.6 °F (37 °C)] 98.4 °F (36.9 °C)  Pulse:  [] 83  Resp:  [18-67] 32  SpO2:  [87 %-100 %] 99 %  BP: (121-179)/(56-84) 130/62     Weight: 47.2 kg (104 lb)  Body mass index is 20.31 kg/m².    Physical Exam  Constitutional:       Appearance: She is well-developed.      Comments: Frail    HENT:      Head: Normocephalic and atraumatic.   Eyes:      Conjunctiva/sclera: Conjunctivae normal.      Pupils: Pupils are equal, round, and reactive to light.   Neck:      Musculoskeletal: Normal range of motion.      Vascular: No JVD.   Cardiovascular:      Rate and Rhythm: Normal rate.      Heart sounds: Normal heart sounds.      Comments: afib   Pulmonary:      Effort: No respiratory distress.      Breath sounds: No wheezing.      Comments: Breath sounds diminished throughout   Abdominal:      General: Bowel sounds are normal. There is no distension.      Palpations: Abdomen is soft.      Tenderness: There is no abdominal tenderness. There is no guarding.   Musculoskeletal: Normal range of motion.         General: Swelling (BLE 1+ ) present. No tenderness.   Skin:     General: Skin is warm and dry.      Capillary Refill: Capillary refill takes less than 2 seconds.   Neurological:      Mental Status: She is alert and oriented to person, place, and time.   Psychiatric:         Behavior: Behavior normal.           CRANIAL NERVES     CN III, IV, VI   Pupils are equal, round, and reactive to light.       Significant Labs:   BMP:   Recent Labs   Lab 07/09/20  1835   *      K 3.2*   CL 98   CO2 36*   BUN 23*   CREATININE 1.09   CALCIUM 8.4*     CBC:   Recent Labs   Lab 07/09/20  1835   WBC 7.58   HGB 11.9*   HCT 38.1        Significant Imaging: I have  reviewed all pertinent imaging results/findings within the past 24 hours.

## 2020-07-10 NOTE — ED NOTES
Pt changed into new brief, incontinence of urine noted. Perineal care given. Pt tolerated well. Transferred to cardio via stretcher with transport on O2 via NC.

## 2020-07-10 NOTE — ED NOTES
Spoke with pt son Rogers over the phone with pt permission. Updated on POC and border status in ED at this time. Signed family up for txt alerts. Will update family accordingly. Rogers (son): [phone]

## 2020-07-10 NOTE — H&P
[facility] Medicine  History & Physical    Patient Name: Colleen Bethea  MRN: [id number]  Admission Date: 7/9/2020  Attending Physician: Denver Bolton DO   Primary Care Provider: Michele Durán MD         Patient information was obtained from patient, past medical records and ER records.     Subjective:     Principal Problem:Acute respiratory failure with hypoxia    Chief Complaint:   Chief Complaint   Patient presents with    Fall     brought to ER by EMS s/p slip and fall today; pt reports falling while ambulating with cane today; hit head; large hematoma noted to posterior skull; bleeding controlled; pt on Eliquis        HPI: Colleen Childs is a 89 yo female with pmh of hypertension, TIA, chronic systolic heart failure, paroxysmal atrial fibrillation and breast CA who presented to [facility] ED s/p mechanical fall. Pt was getting up from chair without her cane when fell backwards striking her head. No LOC. She complains of back pain. No chest pain or shortness of breath, however she was found to be hypoxic in ED (O2 sats 87% room air on initial evaluation). Family denies home O2, reports underlying heart failure with 3 lb  weight gain over the past few days for which she increased her home lasix with noted improvement. Labs remarkable for potassium 3.2, pro-BNP 2480 and troponin 0.036. COVID 19 negative. CXR shows moderate volume right pleural effusion with adjacent airspace consolidation in the right lower and middle lobe suggesting compressive atelectasis and/or pneumonia with vascular congestion. CT head negative. CT thoracic spine shows acute compression fracture. She received Zithromax/Ceftriaxone and furosemide. Patient admitted to [facility].         Past Medical History:   Diagnosis Date    Breast cancer     Hypertension     TIA (transient ischemic attack)        Past Surgical History:   Procedure Laterality Date    BREAST SURGERY      HIP SURGERY          Review of patient's allergies indicates:  No Known Allergies    No current facility-administered medications on file prior to encounter.      Current Outpatient Medications on File Prior to Encounter   Medication Sig    alendronate (FOSAMAX) 70 MG tablet Take 1 tablet (70 mg total) by mouth every 7 days.    apixaban (ELIQUIS) 5 mg Tab Take by mouth 2 (two) times daily.    losartan (COZAAR) 25 MG tablet TAKE 1 TABLET BY MOUTH EVERY DAY    methocarbamol (ROBAXIN) 500 MG Tab TAKE 1 TABLET (500 MG TOTAL) BY MOUTH 2 (TWO) TIMES DAILY AS NEEDED (MUSCLE PAIN).    metoprolol tartrate (LOPRESSOR) 50 MG tablet Take 50 mg by mouth 2 (two) times daily.    nitrofurantoin (MACRODANTIN) 100 MG capsule Take 1 capsule (100 mg total) by mouth 4 (four) times daily.     Family History     Problem Relation (Age of Onset)    No Known Problems Mother, Father        Tobacco Use    Smoking status: Never Smoker    Smokeless tobacco: Never Used   Substance and Sexual Activity    Alcohol use: Yes    Drug use: No    Sexual activity: Not on file     Review of Systems   Constitutional: Negative for chills, diaphoresis and fever.   Eyes: Negative for photophobia.   Respiratory: Negative for cough, chest tightness, shortness of breath and wheezing.    Cardiovascular: Positive for leg swelling. Negative for chest pain and palpitations.   Gastrointestinal: Negative for abdominal pain, diarrhea, nausea and vomiting.   Genitourinary: Negative for dysuria, flank pain, frequency and hematuria.   Musculoskeletal: Negative for back pain and myalgias.   Skin: Positive for wound (scalp ).   Neurological: Positive for weakness. Negative for dizziness, syncope, light-headedness and headaches.   Psychiatric/Behavioral: Negative for confusion.     Objective:     Vital Signs (Most Recent):  Temp: 98.4 °F (36.9 °C) (07/10/20 0307)  Pulse: 83 (07/10/20 0502)  Resp: (!) 32 (07/10/20 0502)  BP: 130/62 (07/10/20 0502)  SpO2: 99 % (07/10/20 0502) Vital  Signs (24h Range):  Temp:  [97.9 °F (36.6 °C)-98.6 °F (37 °C)] 98.4 °F (36.9 °C)  Pulse:  [] 83  Resp:  [18-67] 32  SpO2:  [87 %-100 %] 99 %  BP: (121-179)/(56-84) 130/62     Weight: 47.2 kg (104 lb)  Body mass index is 20.31 kg/m².    Physical Exam  Constitutional:       Appearance: She is well-developed.      Comments: Frail    HENT:      Head: Normocephalic and atraumatic.   Eyes:      Conjunctiva/sclera: Conjunctivae normal.      Pupils: Pupils are equal, round, and reactive to light.   Neck:      Musculoskeletal: Normal range of motion.      Vascular: No JVD.   Cardiovascular:      Rate and Rhythm: Normal rate.      Heart sounds: Normal heart sounds.      Comments: afib   Pulmonary:      Effort: No respiratory distress.      Breath sounds: No wheezing.      Comments: Breath sounds diminished throughout   Abdominal:      General: Bowel sounds are normal. There is no distension.      Palpations: Abdomen is soft.      Tenderness: There is no abdominal tenderness. There is no guarding.   Musculoskeletal: Normal range of motion.         General: Swelling (BLE 1+ ) present. No tenderness.   Skin:     General: Skin is warm and dry.      Capillary Refill: Capillary refill takes less than 2 seconds.   Neurological:      Mental Status: She is alert and oriented to person, place, and time.   Psychiatric:         Behavior: Behavior normal.           CRANIAL NERVES     CN III, IV, VI   Pupils are equal, round, and reactive to light.       Significant Labs:   BMP:   Recent Labs   Lab 07/09/20  1835   *      K 3.2*   CL 98   CO2 36*   BUN 23*   CREATININE 1.09   CALCIUM 8.4*     CBC:   Recent Labs   Lab 07/09/20  1835   WBC 7.58   HGB 11.9*   HCT 38.1        Significant Imaging: I have reviewed all pertinent imaging results/findings within the past 24 hours.    Assessment/Plan:     * Acute respiratory failure with hypoxia  2/2  Acute on chronic systolic heart failure   Suspected right middle and  lower lobe PNA   Right pleural effusion     -presented to ED hypoxic (O2 sats 87% room air)   -CXR remarkable for vascular congestion and moderate volume right pleural effusion with adjacent airspace consolidation in the right lower and middle lobe suggesting compressive atelectasis and/or pneumonia, pro-BNP 2480, no fever or leukocytosis   -Family reported 3 lb weight gain for which they increased home furosemide, usually takes furosemide 20 mg daily per chart review   -S/P furosemide 20 mg in ED   -continue diuresis   -strict intake and output   -cardiac diet, fluid volume restriction   -continue empiric ceftriaxone/zithromax   -follow cultures           Recurrent falls  Presented to ED s/p mechanical fall   Fall precautions       Compression fracture of body of thoracic vertebra  Acute on chronic   -Ct thoracic spine shows acute compression fx of T11 and T8   -continue supportive management   -consider ortho consult, consult PT         TIA (transient ischemic attack)  Taking Apixaban       Paroxysmal atrial fibrillation  Afib on tele, rate controlled   Continue apixaban       Essential hypertension  Continue Losartan  Hold beta blocker for now         VTE Risk Mitigation (From admission, onward)         Ordered     apixaban tablet 5 mg  2 times daily      07/10/20 0317     IP VTE HIGH RISK PATIENT  Once      07/10/20 0316     Place sequential compression device  Until discontinued      07/10/20 0316                   Catarina Mckay NP  Department of Hospital Medicine   [facility]

## 2020-07-10 NOTE — ED NOTES
Pt report received from PASCUAL Vaughan. Pt is AAOx4, appears to be resting comfortably with NADN. Denies any complaints at this time. Vitals stable. Will continue to monitor and assess.

## 2020-07-10 NOTE — ED NOTES
Called patient's son, Rogers to inform him of patient's pending t/f to [facility] ED. Relayed all pertinent pt information.

## 2020-07-10 NOTE — ED NOTES
Patient presents to the ED via [facility] from [facility] to [place] [facility] for admission for respiratory distress.

## 2020-07-10 NOTE — ED PROVIDER NOTES
Encounter Date: 7/9/2020       History     Chief Complaint   Patient presents with    Fall     brought to ER by EMS s/p slip and fall today; pt reports falling while ambulating with cane today; hit head; large hematoma noted to posterior skull; bleeding controlled; pt on Eliquis     Patient presents with via EMS after a fall at home. She was getting up out of her chair and didn't have her cane down properly so she fell backwards. She hit her head and has a hematoma and dried blood to the posterior scalp. No LOC. No vomiting. She is complaining of pain in the back. Denies any pain in the extremities. No chest pain or shortness of breath, but she is hypoxic and has tachypnea on arrival in the ED. I spoke with her son and reports that she had a CHS exacerbation with 3 pound weight gain yesterday. They increased her lasix yesterday and the edema has decreased. No recent fever or cough.            Review of patient's allergies indicates:  No Known Allergies  Past Medical History:   Diagnosis Date    Breast cancer     Hypertension     TIA (transient ischemic attack)      Past Surgical History:   Procedure Laterality Date    BREAST SURGERY      HIP SURGERY       Family History   Problem Relation Age of Onset    No Known Problems Mother     No Known Problems Father      Social History     Tobacco Use    Smoking status: Never Smoker    Smokeless tobacco: Never Used   Substance Use Topics    Alcohol use: Yes    Drug use: No     Review of Systems   Constitutional: Negative for activity change, appetite change, chills, fatigue and fever.   HENT: Negative for congestion, ear pain, rhinorrhea, sore throat and voice change.    Respiratory: Negative for cough, shortness of breath and wheezing.    Cardiovascular: Negative for chest pain and leg swelling.   Gastrointestinal: Negative for abdominal pain, nausea and vomiting.   Genitourinary: Negative for dysuria, flank pain and frequency.   Musculoskeletal: Positive for  back pain. Negative for joint swelling, neck pain and neck stiffness.   Skin: Negative for rash.        + abrasion scalp   Neurological: Negative for dizziness, weakness, numbness and headaches.   All other systems reviewed and are negative.      Physical Exam     Initial Vitals [07/09/20 1740]   BP Pulse Resp Temp SpO2   (!) 179/81 80 20 98.6 °F (37 °C) (!) 87 %      MAP       --         Physical Exam    Nursing note and vitals reviewed.  Constitutional: She appears well-developed and well-nourished. She appears distressed.   HENT:   Head: Normocephalic.   Nose: Nose normal.   Mouth/Throat: Oropharynx is clear and moist.   Large hematoma to the posterior scalp. Tiny abrasion as the site of bleeding which is not controlled. No lacerations.    Eyes: Conjunctivae and EOM are normal. Pupils are equal, round, and reactive to light.   Neck: Normal range of motion. Neck supple.   No midline or spinous tenderness.    Cardiovascular: Normal rate, regular rhythm, normal heart sounds and intact distal pulses.   Pulmonary/Chest: Breath sounds normal. No respiratory distress.   Musculoskeletal:      Comments: Midline tenderness in the thoracic region. Pain with flexion and rotation.    Lymphadenopathy:     She has no cervical adenopathy.   Neurological: She is alert and oriented to person, place, and time. She has normal strength. No sensory deficit.   Skin: Skin is warm and dry.   Psychiatric: She has a normal mood and affect. Her behavior is normal. Judgment and thought content normal.         ED Course   Procedures  Labs Reviewed   CBC W/ AUTO DIFFERENTIAL - Abnormal; Notable for the following components:       Result Value    RBC 3.63 (*)     Hemoglobin 11.9 (*)     Mean Corpuscular Volume 105 (*)     Mean Corpuscular Hemoglobin 32.8 (*)     Mean Corpuscular Hemoglobin Conc 31.2 (*)     RDW 15.8 (*)     Immature Granulocytes 1.7 (*)     Immature Grans (Abs) 0.13 (*)     Lymph # 0.6 (*)     Mono # 1.3 (*)     Lymph% 7.3 (*)      Mono% 17.2 (*)     All other components within normal limits   COMPREHENSIVE METABOLIC PANEL - Abnormal; Notable for the following components:    Potassium 3.2 (*)     CO2 36 (*)     Glucose 125 (*)     BUN, Bld 23 (*)     Calcium 8.4 (*)     Albumin 3.4 (*)     Total Bilirubin 1.2 (*)     Anion Gap 5 (*)     eGFR if  52.4 (*)     eGFR if non  45.4 (*)     All other components within normal limits   TROPONIN I - Abnormal; Notable for the following components:    Troponin I 0.036 (*)     All other components within normal limits   NT-PRO NATRIURETIC PEPTIDE - Abnormal; Notable for the following components:    NT-proBNP 2480 (*)     All other components within normal limits   TROPONIN I - Abnormal; Notable for the following components:    Troponin I 0.052 (*)     All other components within normal limits   SARS-COV-2 RNA AMPLIFICATION, QUAL   PROTIME-INR   PROTIME-INR          Imaging Results          CT Thoracic Spine Without Contrast (Final result)  Result time 07/09/20 18:59:39    Final result by Jasbir Mayberry III, MD (07/09/20 18:59:39)                 Impression:      Multiple chronic appearing osteoporotic compression fractures involving multiple thoracic vertebral bodies and the L1 vertebral bodies with associated fracture retropulsion and central canal stenosis, as above.  Acute compression fracture and possible superimposed lytic bone lesion of the T11 vertebral body.  Possible acute on chronic compression fracture of the T8 vertebral body.      Electronically signed by: Jasbir Mayberry MD  Date:    07/09/2020  Time:    18:59             Narrative:    EXAMINATION:  CT THORACIC SPINE WITHOUT CONTRAST    CLINICAL HISTORY:  Mid-back pain, compression fracture suspected;    TECHNIQUE:  Axial noncontrast CT scan of the thoracic spine with sagittal and coronal reconstructions. All CT scans at this facility are performed  using dose modulation techniques as appropriate to  performed exam including the following:  automated exposure control; adjustment of mA and/or kV according to the patients size (this includes techniques or standardized protocols for targeted exams where dose is matched to indication/reason for exam: i.e. extremities or head);  iterative reconstruction technique.    COMPARISON:  None    FINDINGS:  There are severe chronic appearing anterior compression fractures of the T5, T6, T7, T8, T9 and L1 vertebral bodies with greater than 50% loss of vertebral body height, fracture retropulsion and associated central canal stenosis.  There is a superimposed lucent fracture line of the T8 vertebral body, possibly related to chronic ununited fracture or acute on chronic compression fracture.  There is an acute appearing compression fracture of the T11 vertebral body with a possible superimposed lytic bone lesion.  No other fractures identified.  No other suspicious bone lesions identified.  Vertebral body alignment is within normal limits.  There is a moderate to large volume right pleural effusion and small to moderate left pleural effusion.                               CT Lumbar Spine Without Contrast (Final result)  Result time 07/09/20 19:08:09    Final result by Jasbir Mayberry III, MD (07/09/20 19:08:09)                 Impression:      See above      Electronically signed by: Jasbir Mayberry MD  Date:    07/09/2020  Time:    19:08             Narrative:    EXAMINATION:  CT LUMBAR SPINE WITHOUT CONTRAST    CLINICAL HISTORY:  Polytrauma, critical, T/L spine injury suspected;    TECHNIQUE:  Axial noncontrast CT scan of the lumbar spine with sagittal and coronal reconstructions. All CT scans at this facility are performed  using dose modulation techniques as appropriate to performed exam including the following:  automated exposure control; adjustment of mA and/or kV according to the patients size (this includes techniques or standardized protocols for targeted exams where  dose is matched to indication/reason for exam: i.e. extremities or head);  iterative reconstruction technique.    COMPARISON:  None    FINDINGS:  There is a severe chronic compression fracture and vertebral plana deformity of the L1 vertebral body with fracture retropulsion and moderate associated L1-2 central canal stenosis.  There are mild chronic appearing anterior compression fractures of the L3 and L4 vertebral bodies.  There is a subacute to chronic appearing mild compression fracture of the L5 vertebral body.  There is an acute appearing compression fracture of the T11 vertebral body with up to 75% loss of vertebral body height centrally and mild fracture retropulsion causing mild to moderate central canal stenosis at T11-12.  There is a possible superimposed lytic bone marrow lesion involving the anterior aspect of the T11 vertebral body with elevation of the anterior superior corner of the vertebral body which alternatively could represent a superimposed distraction type fracture.  The posterior elements appear intact.  No other fractures identified.  Multilevel lumbar spondylosis is present.                               CT Cervical Spine Without Contrast (Final result)  Result time 07/09/20 18:49:51    Final result by Jasbir Mayberry III, MD (07/09/20 18:49:51)                 Impression:      No acute cervical spine injury identified.  Partially visualized bilateral pleural effusions, right greater than left.      Electronically signed by: Jasbir Mayberry MD  Date:    07/09/2020  Time:    18:49             Narrative:    EXAMINATION:  CT CERVICAL SPINE WITHOUT CONTRAST    CLINICAL HISTORY:  Neck trauma (Age => 65y);    TECHNIQUE:  Axial noncontrast CT scan of the cervical spine with sagittal and coronal reconstructions. All CT scans at this facility use dose modulation, iterative reconstruction, and/or weight based dosing when appropriate to reduce radiation dose to as low as reasonably  achievable.    FINDINGS:  No acute paravertebral soft tissue abnormality identified.  No fracture, traumatic malalignment or other acute injury is seen in the cervical spine.  There is mild grade 1 degenerative anterolisthesis at C7-T1.  Vertebral body alignment is otherwise anatomic.  There is multilevel cervical degenerative disc disease and facet arthropathy with posterior disc osteophyte complexes.  No critical central canal stenosis identified.  There is a partially visualized moderate to large right pleural effusion and smaller left pleural effusion.                               CT Head Without Contrast (Final result)  Result time 07/09/20 18:31:12    Final result by Jasbir Mayberry III, MD (07/09/20 18:31:12)                 Impression:      Large posterior scalp hematoma.  No acute intracranial disease or skull fracture identified.    Age-related atrophy and chronic microvascular ischemic change.      Electronically signed by: Jasbir Mayberry MD  Date:    07/09/2020  Time:    18:31             Narrative:    EXAMINATION:  CT HEAD WITHOUT CONTRAST    CLINICAL HISTORY:  Head trauma, minor (Age => 65y);    TECHNIQUE:  Routine noncontrast axial head CT. All CT scans at this facility are performed  using dose modulation techniques as appropriate to performed exam including the following:  automated exposure control; adjustment of mA and/or kV according to the patients size (this includes techniques or standardized protocols for targeted exams where dose is matched to indication/reason for exam: i.e. extremities or head);  iterative reconstruction technique.    COMPARISON:  none    FINDINGS:  There is generalized age-related atrophy and chronic microvascular ischemic change.  There is no CT evidence of acute major vascular territory infarct.  There is no evidence of intracranial hemorrhage, mass-effect, hydrocephalus or other acute disease. The visualized paranasal sinuses and mastoid air cells are clear.  There is  a prominent right posterosuperior scalp hematoma.  No calvarial fracture is identified.                               X-Ray Chest AP Portable (Final result)  Result time 07/09/20 18:32:55    Final result by Jasbir Mayberry III, MD (07/09/20 18:32:55)                 Impression:      See above.      Electronically signed by: Jasbir Mayberry MD  Date:    07/09/2020  Time:    18:32             Narrative:    EXAMINATION:  XR CHEST AP PORTABLE    CLINICAL HISTORY:  hypoxia;    COMPARISON:  October 2, 2017    FINDINGS:  Stable cardiomegaly.  There is a moderate volume right pleural effusion with adjacent airspace consolidation in the right lower and middle lobe suggesting compressive atelectasis and/or pneumonia.  Findings appear fairly similar to prior exam.  There are streaky opacities in the right perihilar region, likely subsegmental atelectasis and vascular congestion.  The remainder of the lungs appear clear of active disease.  No evidence of pneumothorax                                 Medical Decision Making:   Tetanus up to date. She is having a CHF exacerbation and was given a dose of Lasix in addition to potassium in the ED.Second troponin was elevated. She will be turned over to Dr. Hammer at end of my shift. She needs admission for repeat troponin and CHF and she is requesting . [facility] request placed.                                  Clinical Impression:       ICD-10-CM ICD-9-CM   1. Elevated troponin  R79.89 790.6   2. Hypoxic  R09.02 799.02   3. Scalp hematoma, initial encounter  S00.03XA 920   4. Head injury without skull fracture, initial encounter  S09.90XA 854.00   5. Closed wedge compression fracture of eleventh thoracic vertebra, initial encounter  S22.080A 805.2   6. Acute on chronic systolic congestive heart failure  I50.23 428.23     428.0                                YONATAN Myrick  07/09/20 2033       YONATAN Myrick  07/09/20 2128

## 2020-07-10 NOTE — ASSESSMENT & PLAN NOTE
2/2  Acute on chronic systolic heart failure   Suspected right middle and lower lobe PNA   Right pleural effusion     -presented to ED hypoxic (O2 sats 87% room air)   -CXR remarkable for vascular congestion and moderate volume right pleural effusion with adjacent airspace consolidation in the right lower and middle lobe suggesting compressive atelectasis and/or pneumonia, pro-BNP 2480, no fever or leukocytosis   -Family reported 3 lb weight gain for which they increased home furosemide, usually takes furosemide 20 mg daily per chart review   -S/P furosemide 20 mg in ED   -continue diuresis   -strict intake and output   -cardiac diet, fluid volume restriction   -continue empiric ceftriaxone/zithromax   -follow cultures

## 2020-07-10 NOTE — PLAN OF CARE
Pt AAOx4, VSS, NAD. Pt has moments of confusion. Hematoma located on the back of the head. No complaints of pain.  Pt tolerating diet. Cardiac monitor in place. Safety maintained. Will continue to monitor.

## 2020-07-11 VITALS
HEART RATE: 114 BPM | OXYGEN SATURATION: 97 % | WEIGHT: 107.56 LBS | TEMPERATURE: 99 F | SYSTOLIC BLOOD PRESSURE: 119 MMHG | RESPIRATION RATE: 20 BRPM | DIASTOLIC BLOOD PRESSURE: 73 MMHG | BODY MASS INDEX: 21.12 KG/M2 | HEIGHT: 60 IN

## 2020-07-11 PROBLEM — G45.9 TIA (TRANSIENT ISCHEMIC ATTACK): Status: RESOLVED | Noted: 2020-07-10 | Resolved: 2020-07-11

## 2020-07-11 PROBLEM — I50.23 ACUTE ON CHRONIC SYSTOLIC HEART FAILURE: Status: RESOLVED | Noted: 2020-07-10 | Resolved: 2020-07-11

## 2020-07-11 LAB
ANION GAP SERPL CALC-SCNC: 9 MMOL/L (ref 8–16)
BASOPHILS # BLD AUTO: 0.04 K/UL (ref 0–0.2)
BASOPHILS NFR BLD: 0.5 % (ref 0–1.9)
BUN SERPL-MCNC: 15 MG/DL (ref 8–23)
CALCIUM SERPL-MCNC: 8.4 MG/DL (ref 8.7–10.5)
CHLORIDE SERPL-SCNC: 103 MMOL/L (ref 95–110)
CO2 SERPL-SCNC: 28 MMOL/L (ref 23–29)
CREAT SERPL-MCNC: 0.8 MG/DL (ref 0.5–1.4)
DIFFERENTIAL METHOD: ABNORMAL
EOSINOPHIL # BLD AUTO: 0 K/UL (ref 0–0.5)
EOSINOPHIL NFR BLD: 0.1 % (ref 0–8)
ERYTHROCYTE [DISTWIDTH] IN BLOOD BY AUTOMATED COUNT: 15.9 % (ref 11.5–14.5)
EST. GFR  (AFRICAN AMERICAN): >60 ML/MIN/1.73 M^2
EST. GFR  (NON AFRICAN AMERICAN): >60 ML/MIN/1.73 M^2
GLUCOSE SERPL-MCNC: 105 MG/DL (ref 70–110)
HCT VFR BLD AUTO: 34.7 % (ref 37–48.5)
HGB BLD-MCNC: 10.4 G/DL (ref 12–16)
IMM GRANULOCYTES # BLD AUTO: 0.03 K/UL (ref 0–0.04)
IMM GRANULOCYTES NFR BLD AUTO: 0.4 % (ref 0–0.5)
LYMPHOCYTES # BLD AUTO: 0.5 K/UL (ref 1–4.8)
LYMPHOCYTES NFR BLD: 6.2 % (ref 18–48)
MAGNESIUM SERPL-MCNC: 1.8 MG/DL (ref 1.6–2.6)
MCH RBC QN AUTO: 32.8 PG (ref 27–31)
MCHC RBC AUTO-ENTMCNC: 30 G/DL (ref 32–36)
MCV RBC AUTO: 110 FL (ref 82–98)
MONOCYTES # BLD AUTO: 1.6 K/UL (ref 0.3–1)
MONOCYTES NFR BLD: 18.1 % (ref 4–15)
NEUTROPHILS # BLD AUTO: 6.4 K/UL (ref 1.8–7.7)
NEUTROPHILS NFR BLD: 74.7 % (ref 38–73)
NRBC BLD-RTO: 0 /100 WBC
PHOSPHATE SERPL-MCNC: 3.7 MG/DL (ref 2.7–4.5)
PLATELET # BLD AUTO: 128 K/UL (ref 150–350)
PMV BLD AUTO: 11.2 FL (ref 9.2–12.9)
POTASSIUM SERPL-SCNC: 4.8 MMOL/L (ref 3.5–5.1)
RBC # BLD AUTO: 3.17 M/UL (ref 4–5.4)
SODIUM SERPL-SCNC: 140 MMOL/L (ref 136–145)
WBC # BLD AUTO: 8.57 K/UL (ref 3.9–12.7)

## 2020-07-11 PROCEDURE — 94761 N-INVAS EAR/PLS OXIMETRY MLT: CPT

## 2020-07-11 PROCEDURE — 85025 COMPLETE CBC W/AUTO DIFF WBC: CPT

## 2020-07-11 PROCEDURE — 97162 PT EVAL MOD COMPLEX 30 MIN: CPT

## 2020-07-11 PROCEDURE — 84100 ASSAY OF PHOSPHORUS: CPT

## 2020-07-11 PROCEDURE — 25000003 PHARM REV CODE 250: Performed by: NURSE PRACTITIONER

## 2020-07-11 PROCEDURE — 97535 SELF CARE MNGMENT TRAINING: CPT

## 2020-07-11 PROCEDURE — 63600175 PHARM REV CODE 636 W HCPCS: Performed by: NURSE PRACTITIONER

## 2020-07-11 PROCEDURE — 83735 ASSAY OF MAGNESIUM: CPT

## 2020-07-11 PROCEDURE — 97165 OT EVAL LOW COMPLEX 30 MIN: CPT

## 2020-07-11 PROCEDURE — 80048 BASIC METABOLIC PNL TOTAL CA: CPT

## 2020-07-11 PROCEDURE — 36415 COLL VENOUS BLD VENIPUNCTURE: CPT

## 2020-07-11 PROCEDURE — 27000221 HC OXYGEN, UP TO 24 HOURS

## 2020-07-11 RX ORDER — AZITHROMYCIN 500 MG/1
500 TABLET, FILM COATED ORAL DAILY
Qty: 6 TABLET | Refills: 0 | Status: SHIPPED | OUTPATIENT
Start: 2020-07-11 | End: 2020-07-17

## 2020-07-11 RX ORDER — AZITHROMYCIN 500 MG/1
500 TABLET, FILM COATED ORAL DAILY
Qty: 5 TABLET | Refills: 0 | Status: SHIPPED | OUTPATIENT
Start: 2020-07-11 | End: 2020-07-11

## 2020-07-11 RX ADMIN — FUROSEMIDE 20 MG: 10 INJECTION, SOLUTION INTRAVENOUS at 09:07

## 2020-07-11 RX ADMIN — APIXABAN 2.5 MG: 2.5 TABLET, FILM COATED ORAL at 09:07

## 2020-07-11 RX ADMIN — LOSARTAN POTASSIUM 25 MG: 25 TABLET, FILM COATED ORAL at 09:07

## 2020-07-11 NOTE — ASSESSMENT & PLAN NOTE
Acute on chronic   -Ct thoracic spine shows acute compression fx of T11 and T8   -continue supportive management       PT/ot evaluated pt.  recs for HH and RW  Pt is gragile, not cadidate for procedure for her acute and chronic vertebrae fractures

## 2020-07-11 NOTE — DISCHARGE SUMMARY
[facility] Medicine  Discharge Summary      Patient Name: Colleen Bethea  MRN: [id number]  Admission Date: 7/9/2020  Hospital Length of Stay: 1 days  Discharge Date and Time: No discharge date for patient encounter.  Attending Physician: Denver Bolton DO   Discharging Provider: Denver Bolton DO  Primary Care Provider: Michele Durán MD      HPI:   Colleen Childs is a 89 yo female with pmh of hypertension, TIA, chronic systolic heart failure, paroxysmal atrial fibrillation and breast CA who presented to [facility] ED s/p mechanical fall. Pt was getting up from chair without her cane when fell backwards striking her head. No LOC. She complains of back pain. No chest pain or shortness of breath, however she was found to be hypoxic in ED (O2 sats 87% room air on initial evaluation). Family denies home O2, reports underlying heart failure with 3 lb  weight gain over the past few days for which she increased her home lasix with noted improvement. Labs remarkable for potassium 3.2, pro-BNP 2480 and troponin 0.036. COVID 19 negative. CXR shows moderate volume right pleural effusion with adjacent airspace consolidation in the right lower and middle lobe suggesting compressive atelectasis and/or pneumonia with vascular congestion. CT head negative. CT thoracic spine shows acute compression fracture. She received Zithromax/Ceftriaxone and furosemide. Patient admitted to [facility] hospital medicine.         * No surgery found *      Hospital Course:   7/11 pt doing well, she had been asking to go home since yesterday, her o2 sat is above 95% on RA.  She had PT/OT, discussed with them, recommended continue HH and RW.  Pt will have azithromax for her pna as outpatient.  She can be dc dafely.  Discussed with son, there is always help from the family all the time.       Consults: PT/OT        * Acute respiratory failure with hypoxia  Dc home on po azithromax for pna  o2 sat on RA above  95%  Continue HH PT/OT    Recurrent falls  Continue HH PT/OT  RW    Pleural effusion  Dc home on po azithromax for pna  o2 sat on RA above 95%  Continue HH PT/OT      Community acquired pneumonia of right lung  Dc home on po azithromax for pna  o2 sat on RA above 95%  Continue HH PT/OT      Compression fracture of body of thoracic vertebra  Acute on chronic   -Ct thoracic spine shows acute compression fx of T11 and T8   -continue supportive management       PT/ot evaluated pt.  recs for HH and RW  Pt is gragile, not cadidate for procedure for her acute and chronic vertebrae fractures      Paroxysmal atrial fibrillation  Continue home meds  Continue apixaban       Essential hypertension  Continue Losartan, BB   Dc home on po azithromax for pna  o2 sat on RA above 95%  Continue HH PT/OT        From the history of the patient's records at [facility]  Encounter Diagnosis  Chronic systolic (congestive) heart failure (Final) -   Paroxysmal atrial fibrillation (Final) -   Angina pectoris, unspecified (Final) -   Atherosclerosis of native arteries of extremities with intermittent claudication, unspecified extremity (Final) -   Discharge Disposition: 02-Home with no services  Attending Physician: Tate Moseley MD  Admitting Physician: Tate Moseley MD    Pt could qualify for o2 NC at home at 2 L to keep o2 sat more than 92%      Final Active Diagnoses:    Diagnosis Date Noted POA    PRINCIPAL PROBLEM:  Acute respiratory failure with hypoxia [J96.01] 07/09/2020 Yes    Essential hypertension [I10] 07/10/2020 Yes    Paroxysmal atrial fibrillation [I48.0] 07/10/2020 Yes    Compression fracture of body of thoracic vertebra [S22.000A] 07/10/2020 Yes    Community acquired pneumonia of right lung [J18.9] 07/10/2020 Yes    Pleural effusion [J90] 07/10/2020 Yes    Recurrent falls [R29.6] 07/10/2020 Not Applicable      Problems Resolved During this Admission:    Diagnosis Date Noted Date Resolved POA    Acute on chronic systolic  "heart failure [I50.23] 07/10/2020 07/11/2020 Yes    TIA (transient ischemic attack) [G45.9] 07/10/2020 07/11/2020 Yes       Discharged Condition: stable    Disposition: Home or Self Care    Follow Up:  Follow-up Information     Michele Durán MD.    Specialty: Family Medicine  Contact information:  [address] [place] [address]  [phone]                 Patient Instructions:      WALKER FOR HOME USE     Order Specific Question Answer Comments   Type of Walker: Kike (4'4"-5'7")    With wheels? Yes    Height: 5' (1.524 m)    Weight: 48.8 kg (107 lb 9.4 oz)    Length of need (1-99 months): 99    Does patient have medical equipment at home? none    Please check all that apply: Patient's condition impairs ambulation.    Please check all that apply: Patient is unable to safely ambulate without equipment.      Diet Adult Regular     Notify your health care provider if you experience any of the following:  temperature >100.4     Notify your health care provider if you experience any of the following:  persistent nausea and vomiting or diarrhea     Notify your health care provider if you experience any of the following:  severe uncontrolled pain     Notify your health care provider if you experience any of the following:  difficulty breathing or increased cough     Notify your health care provider if you experience any of the following:  severe persistent headache     Notify your health care provider if you experience any of the following:  worsening rash     Notify your health care provider if you experience any of the following:  persistent dizziness, light-headedness, or visual disturbances     Notify your health care provider if you experience any of the following:  increased confusion or weakness     Activity as tolerated       Significant Diagnostic Studies: Labs:   BMP:   Recent Labs   Lab 07/09/20  1835 07/10/20  0516 07/11/20  0516   * 138* 105    140 140   K 3.2* 3.4* 4.8   CL 98 101 103 "   CO2 36* 28 28   BUN 23* 17 15   CREATININE 1.09 0.9 0.8   CALCIUM 8.4* 8.0* 8.4*   MG  --  1.7 1.8    and CBC   Recent Labs   Lab 07/09/20  1835 07/10/20  0516 07/11/20  0516   WBC 7.58 9.93 8.57   HGB 11.9* 11.2* 10.4*   HCT 38.1 35.3* 34.7*    159 128*       Pending Diagnostic Studies:     None         Medications:  Reconciled Home Medications:      Medication List      START taking these medications    azithromycin 500 MG tablet  Commonly known as: ZITHROMAX  Take 1 tablet (500 mg total) by mouth once daily. for 5 days        CONTINUE taking these medications    alendronate 70 MG tablet  Commonly known as: FOSAMAX  Take 1 tablet (70 mg total) by mouth every 7 days.     ELIQUIS 5 mg Tab  Generic drug: apixaban  Take by mouth 2 (two) times daily.     losartan 25 MG tablet  Commonly known as: COZAAR  TAKE 1 TABLET BY MOUTH EVERY DAY     methocarbamoL 500 MG Tab  Commonly known as: ROBAXIN  TAKE 1 TABLET (500 MG TOTAL) BY MOUTH 2 (TWO) TIMES DAILY AS NEEDED (MUSCLE PAIN).     metoprolol tartrate 50 MG tablet  Commonly known as: LOPRESSOR  Take 50 mg by mouth 2 (two) times daily.        STOP taking these medications    nitrofurantoin 100 MG capsule  Commonly known as: MACRODANTIN            Indwelling Lines/Drains at time of discharge:   Lines/Drains/Airways     None                 Time spent on the discharge of patient: 41 minutes  Patient was seen and examined on the date of discharge and determined to be suitable for discharge.         Denver Bolton DO  Department of Hospital Medicine  [facility]

## 2020-07-11 NOTE — PLAN OF CARE
VIRTUAL NURSE:  Cued into patient's room.  Patient not responding to introduction and permission inquiry.  Patient resting comfortably in bed with eyes closed; NC in place, respirations even and unlabored.  No distress noted.    Labs, notes, orders, and careplan reviewed.   Big Walking 5 min and 15 sec Cues for arm swings      Treatment Comments:      Performance on Daily Tasks? (ie., % modeling/cueing; duration; initiation; fatigue)   Uncued Trials? Cued Trials? Spontaneous \"off the cuff\" BIGNESS? (in between exercises)    Calibration: Do they report self-correction? Self-perception changes? When. Pt self corrected on side step    Special Concerns: Pain/ROM/ability to follow cues? Motivation? Fatigue? Still fatigues easily from not feeling well. Able to follow cues. Appears motivated    OTHER:       Carryover Assignments:     Objective Findings: increased time Big Walking     Communication with other providers:  fax'd POC to physician     Education provided to patient:     Adverse Reactions to treatment:  none     Timed Code Treatment Minutes:  60     Total Treatment Minutes: 60     Treatment/Activity Tolerance:           [x]  Patient tolerated treatment well        []  Patient limited by fatique                    []  Patient limited by pain          []  Patient limited by other medical complications          []  Other:      Goals:  Goals MET  Time Frame for Long term goals : 6 weeks  Long term goal 1: Pt. will be able to complete BIG exercises at home with min vc from wife.   Long term goal 2: Pt. will report increase ease in completing ADLs  Long term goal 3: Pt. will decrease TUG time to reduce risk of falls.           Patient Requires Follow-up:             [x]  Yes               []  No     Plan:    []  Continue per plan of care     []  Alter current plan (see comments)     []  Plan of care initiated []  Hold pending MD visit           [x]  Discharge     Plan for Next Session:       Electronically signed by: Stacey DIAZ/BETO

## 2020-07-11 NOTE — PLAN OF CARE
Patient resting in bed, confused. Medications administered as ordered. No complaints of pain or discomfort. Patient moved closer to nurses station overnight after pulling out IV and getting out of bed. Asleep on and off through the shift. Encouraged to call with needs or concerns. Will continue to monitor.

## 2020-07-11 NOTE — ASSESSMENT & PLAN NOTE
Continue Losartan, BB   Dc home on po azithromax for pna  o2 sat on RA above 95%  Continue HH PT/OT

## 2020-07-11 NOTE — PLAN OF CARE
Discharge orders noted, HH resumption orders sent and accepted via [employer] by [facility].  nurse will contact patient to arrange a visit for Sunday 7/12/20    DME: Ember ordered, [place] contacted [facility]. [place] spoke with on call Mr Vega. He gave TN permission to pull Kike-RW from [employer]. TN delivered Kike-RW bedside to pt. Signed paperwork delivered to [employer]    Pt's nurse will go over medications/signs and symptoms prior to discharge         07/11/20 1352   Final Note   Assessment Type Final Discharge Note   Anticipated Discharge Disposition Home-Health  ([facility] HH resumption)   What phone number can be called within the next 1-3 days to see how you are doing after discharge? [phone]   Hospital Follow Up  Appt(s) scheduled? No  (Offices closed for weekend. Patient to schedule own follow up appointment.)   Right Care Referral Info   Post Acute Recommendation Home-care   Referral Type [facility] HH resumption   Facility Name [facility] resumption     Jes Orr RN Transitional Navigator  [phone]

## 2020-07-11 NOTE — PLAN OF CARE
Problem: Occupational Therapy Goal  Goal: Occupational Therapy Goal  Outcome: Adequate for Care Transition   OT initial eval and treatment completed.  Pt. Presents with poor safety awareness, fall risk,poor use of RW during fx ambulation in room for ADLS and poor immediate memory recall of .  OT recommending HHOT and 24/7 family /caregiver assistance 2/2 pt is a high fall risk and needs assistance with basic ADLS and IADLS.  Educated pt in spinal precautions to limit bending forward down to her feet because of T11 compression fx.  Pt c/o no pain, however. Mod-mild wheezing with ambulation and ADLS.  DC OT 2/2 pt to discharge home today with HH.

## 2020-07-11 NOTE — PT/OT/SLP EVAL
Physical Therapy Evaluation    Patient Name:  Colleen Bethea   MRN:  [id number]    Recommendations:     Discharge Recommendations:  home health PT, home health OT   Discharge Equipment Recommendations: walker, rolling   Barriers to discharge: Decreased caregiver support    Assessment:     Colleen Bethea is a 88 y.o. female admitted with a medical diagnosis of Acute respiratory failure with hypoxia.  She presents with the following impairments/functional limitations:  weakness, impaired endurance, impaired coordination, orthopedic precautions, impaired self care skills, impaired functional mobilty, decreased lower extremity function, gait instability, decreased safety awareness, impaired balance PT evaluation completed. Patient agreeable to participate. Patient was adamant and perseverated on wanting to go home and would repeat over and over that Dr. Bolton said she go home at 10.    Rehab Prognosis: Fair; patient would benefit from acute skilled PT services to address these deficits and reach maximum level of function.    Recent Surgery: * No surgery found *      Plan:     During this hospitalization, patient to be seen 5 x/week to address the identified rehab impairments via gait training, therapeutic activities, therapeutic exercises, neuromuscular re-education and progress toward the following goals:    · Plan of Care Expires:  08/10/20    Subjective     Chief Complaint: wanting to go home today  Patient/Family Comments/goals: leave and go home  Pain/Comfort:  · Pain Rating 1: 0/10  · Pain Rating Post-Intervention 1: 0/10    Patients cultural, spiritual, Moravian conflicts given the current situation: no    Living Environment:  Patient lives alone in a [facility] with no steps to enter  Prior to admission, patients level of function was independent with intermittent family assistance.  Equipment used at home: cane, straight, shower chair.  DME owned (not currently used): none stated.  Upon discharge,  patient will have assistance from son.    Objective:     Communicated with PASCUAL Wright prior to session.  Patient found on BSC with oxygen, telemetry, peripheral IV  upon PT entry to room.    General Precautions: Standard, fall   Orthopedic Precautions:spinal precautions   Braces: N/A     Exams:  · RLE Strength: Deficits: grossly 4/5  · LLE Strength: grossly 4/5    Functional Mobility:  · Transfers:     · Sit to Stand:  contact guard assistance with rolling walker  · Gait: Patient ambulated 20 ft with CGA and RW; patient required verbal cues to stay close to the RW    Therapeutic Activities and Exercises:   PT evaluation completed. See above for recommendations and assessment.    AM-PAC 6 CLICK MOBILITY  Total Score:18     Patient left up in chair with all lines intact, call button in reach and chair alarm on.    GOALS:   Multidisciplinary Problems     Physical Therapy Goals        Problem: Physical Therapy Goal    Goal Priority Disciplines Outcome Goal Variances Interventions   Physical Therapy Goal     PT, PT/OT Ongoing, Progressing     Description: Goals to be met by: 8/10/20     Patient will increase functional independence with mobility by performin. Sit to stand transfer with Supervision  2. Gait  x 100 feet with Stand-by Assistance using Rolling Walker.                      History:     Past Medical History:   Diagnosis Date    Breast cancer     Hypertension     TIA (transient ischemic attack)        Past Surgical History:   Procedure Laterality Date    BREAST SURGERY      HIP SURGERY         Time Tracking:     PT Received On: 20  PT Start Time: 920     PT Stop Time: 944  PT Total Time (min): 24 min     Billable Minutes: Evaluation 24      Yenny Santiago, PT  2020

## 2020-07-11 NOTE — PLAN OF CARE
VN reviewed discharge instructions with pt's son Rogers by telephone. AVS printed and handed to pt by bedside nurse.  Reviewed followup appts, medication, diet, and importance of medication compliance.  Reviewed home care instructions, treatment plan, self management and when to seek medical attention.  Allowed time for questions, all questions answered.  Pt 's son verbalized complete understanding of discharge instructions and voices no concerns.      Discharge instructions complete.   Transport wheelchair requested.  Bedside nurse notified.

## 2020-07-11 NOTE — PLAN OF CARE
Problem: Physical Therapy Goal  Goal: Physical Therapy Goal  Description: Goals to be met by: 8/10/20     Patient will increase functional independence with mobility by performin. Sit to stand transfer with Supervision  2. Gait  x 100 feet with Stand-by Assistance using Rolling Walker.     Outcome: Ongoing, Progressing     PT evaluation completed. Patient demonstrates impaired safety awareness and would benefit from supervision in home environment. Patient on BSC upon PT entry and left up in chair with alarm set, all needs in reach, and nsg notified. See full note for details.

## 2020-07-11 NOTE — HOSPITAL COURSE
7/11 pt doing well, she had been asking to go home since yesterday, her o2 sat is above 95% on RA.  She had PT/OT, discussed with them, recommended continue HH and RW.  Pt will have azithromax for her pna as outpatient.  She can be dc dafely.  Discussed with son, there is always help from the family all the time.

## 2020-07-11 NOTE — PT/OT/SLP EVAL
Occupational Therapy   Evaluation/Treatment/Discharge Summary    Name: Colleen Bethea  MRN: [id number]  Admitting Diagnosis:  Acute respiratory failure with hypoxia    The primary encounter diagnosis was Acute respiratory failure with hypoxia. Diagnoses of Hypoxic, Scalp hematoma, initial encounter, Head injury without skull fracture, initial encounter, Closed wedge compression fracture of eleventh thoracic vertebra, initial encounter, Acute on chronic systolic congestive heart failure, Elevated troponin, Pneumonia of right lung due to infectious organism, unspecified part of lung, Chest pain, and CHF (congestive heart failure) were also pertinent to this visit.    Recommendations:     Discharge Recommendations: home with home health, home health OT  Discharge Equipment Recommendations:  none  Barriers to discharge:  None    Assessment:     Colleen Bethea is a 88 y.o. female with a medical diagnosis of Acute respiratory failure with hypoxia.  She presents with Performance deficits affecting function: weakness, impaired endurance, impaired self care skills, impaired functional mobilty, gait instability, impaired balance, impaired cognition, decreased coordination, decreased upper extremity function, decreased lower extremity function, decreased safety awareness, impaired coordination, impaired cardiopulmonary response to activity     OT initial eval and treatment completed.  Pt. Presents with poor safety awareness, fall risk,poor use of RW during fx ambulation in room for ADLS and poor immediate memory recall of .  OT recommending HHOT and 24/7 family /caregiver assistance 2/2 pt is a high fall risk and needs assistance with basic ADLS and IADLS.  Educated pt in spinal precautions to limit bending forward down to her feet because of T11 compression fx.  Pt c/o no pain, however. Mod-mild wheezing with ambulation and ADLS.  DC OT 2/2 pt to discharge home today with HH.    Rehab Prognosis: Fair;  patient would benefit from acute skilled OT services to address these deficits and reach maximum level of function.       Plan:     Patient to be seen   to address the above listed problems via self-care/home management, therapeutic activities, therapeutic exercises  · Plan of Care Expires: 07/18/20  · Plan of Care Reviewed with: patient    Subjective     Chief Complaint: NONE  Patient/Family Comments/goals: to go home    Occupational Profile:  Living Environment: lives alone [facility] with no steps; tub/shower combo and walk n shower with shower chair  Previous level of function: pt reportedly was indep-[place] with basic ADLS, receiving HHPT, ambulated [place] with SPC, family brings meals, pt  prepares microwave meals mdoI, does her own laundry.  Roles and Routines: active in the home  Equipment Used at Home:  cane, straight, shower chair  Assistance upon Discharge: family, neighbors     Pain/Comfort:  · Pain Rating 1: 0/10  · Pain Rating Post-Intervention 1: 0/10    Patients cultural, spiritual, Yarsanism conflicts given the current situation: no    Objective:     Communicated with: nurse prior to session.  Patient found up in chair with oxygen, telemetry, peripheral IV(chair alarm) upon OT entry to room.    General Precautions: Standard, fall, hearing impaired   Orthopedic Precautions:spinal precautions(t11 compression fx)   Braces:       Occupational Performance:      Functional Mobility/Transfers:  · Patient completed Sit <> Stand Transfer with stand by assistance  with  rolling walker and grab bars(s)   · Patient completed Toilet Transfer Step Transfer technique with contact guard assistance with  rolling walker  · Functional Mobility: ambulated with min A for proper walker placement and standing inside walker, pt  Pushes walker too far in front of her, max vc for self correction and min Assist.    Activities of Daily Living:  · Feeding:  setup seated BS chiar  · Grooming: minimum assistance standing at sink for  balance, problem solving, attention, reaching dispenser  · Lower Body Dressing: maximal assistance. Pt doffed socks bending down but deterred her from using this tech so she pushed them off her feet with opposite foot.Educated her in thoracic fx to limit bending down, pt hard to understanding so demionstrated to cross leg or lift  leg up toward her chest, only able to reach top of ankle so had to assist with donning socks  · Toileting: minimum assistance donned/doffed brief standing; mild wheezing standing >2 min with RW     Cognitive/Visual Perceptual:  Cognitive/Psychosocial Skills:     -       Oriented to: Person, Place and Time   -       Follows Commands/attention:Follows one-step commands  -       Communication: clear/fluent  -       Memory: Poor immediate recall  -       Safety awareness/insight to disability: impaired   -       Mood/Affect/Coping skills/emotional control: Cooperative  Visual/Perceptual:      -Intact glasses    Physical Exam:  Balance:    -       sitting:  good   dynamic;  fair plus  standing;  fair   dynamic:  poor plus  Postural examination/scapula alignment:    -       Rounded shoulders  -       Forward head  -       Posterior pelvic tilt  -       Kyphosis  Dominant hand:    -       right  Upper Extremity Range of Motion:     -       Right Upper Extremity: WFL  -       Left Upper Extremity: WFL  Upper Extremity Strength:    -       Right Upper Extremity: Deficits: grossly 4-/5  -       Left Upper Extremity: Deficits: grossly 3-/5-4-/5   Strength:    -       Right Upper Extremity: WFL  -       Left Upper Extremity: WFL    AMPAC 6 Click ADL:  AMPAC Total Score: 16    Treatment & Education:  --role of OT and POC, pt verbalized understanding.  --selfcare and fx mobility training using RW in room with emphasis on training and educating pt in importance and safety of standing postion inside RW to minimize fall risk and protect back. Max vc for adherence.  Education:    Patient left up in  chair with all lines intact, call button in reach, chair alarm on and nurse notified    GOALS:   Multidisciplinary Problems     Occupational Therapy Goals        Problem: Occupational Therapy Goal    Goal Priority Disciplines Outcome Interventions   Occupational Therapy Goal     OT, PT/OT Adequate for Care Transition                    History:     Past Medical History:   Diagnosis Date    Breast cancer     Hypertension     TIA (transient ischemic attack)        Past Surgical History:   Procedure Laterality Date    BREAST SURGERY      HIP SURGERY         Time Tracking:     OT Date of Treatment: 07/11/20  OT Start Time: 1129  OT Stop Time: 1155  OT Total Time (min): 26 min    Billable Minutes:Evaluation 16  Self Care/Home Management 10  Total Time 26    Humaira Reyna OT  7/11/2020

## 2020-07-11 NOTE — PLAN OF CARE
[facility]  HOME  HEALTH ORDERS     07/11/2020    Resume Home Health    Diagnoses:  Active Hospital Problems    Diagnosis  POA    *Acute respiratory failure with hypoxia [J96.01]  Yes    Essential hypertension [I10]  Yes    Paroxysmal atrial fibrillation [I48.0]  Yes    Compression fracture of body of thoracic vertebra [S22.000A]  Yes    Community acquired pneumonia of right lung [J18.9]  Yes    Pleural effusion [J90]  Yes    Recurrent falls [R29.6]  Not Applicable      Resolved Hospital Problems    Diagnosis Date Resolved POA    Acute on chronic systolic heart failure [I50.23] 07/11/2020 Yes    TIA (transient ischemic attack) [G45.9] 07/11/2020 Yes       Patient is homebound due to:  Acute respiratory failure with hypoxia    Allergies:Review of patient's allergies indicates:  No Known Allergies    Diet: regular, low salt    DME: oxygen for home use  2 L NC for o2 sat more than 92% goal.  Qualifying dx: Chronic congestive heart failure      Acitivities: as tolerated.  Use RW / cane for safety ambulation    Nursing:   SN to complete comprehensive assessment including routine vital signs. Instruct on disease process and s/s of complications to report to MD. Review/verify medication list sent home with the patient at time of discharge  and instruct patient/caregiver as needed. Frequency may be adjusted depending on start of care date.    Notify MD if SBP > 160 or < 90; DBP > 90 or < 50; HR > 120 or < 50; Temp > 101;    CONSULTS:    PT to evaluate and treat. Evaluate for home safety and equipment needs; Establish/upgrade home exercise program. Perform / instruct on therapeutic exercises, gait training, transfer training, and Range of Motion.    OT to evaluate and treat. Evaluate home environment for safety and equipment needs. Perform/Instruct on transfers, ADL training, ROM, and therapeutic exercises.    Aide to provide assistance with personal care, ADLs, and vital signs    Medications:  Review discharge medications with patient and family and provide education.       Colleen Childs   Home Medication Instructions DAVIDSON:[id number]    Printed on:07/11/20 1127   Medication Information                      alendronate (FOSAMAX) 70 MG tablet  Take 1 tablet (70 mg total) by mouth every 7 days.             apixaban (ELIQUIS) 5 mg Tab  Take by mouth 2 (two) times daily.             azithromycin (ZITHROMAX) 500 MG tablet  Take 1 tablet (500 mg total) by mouth once daily. for 5 days             losartan (COZAAR) 25 MG tablet  TAKE 1 TABLET BY MOUTH EVERY DAY             methocarbamol (ROBAXIN) 500 MG Tab  TAKE 1 TABLET (500 MG TOTAL) BY MOUTH 2 (TWO) TIMES DAILY AS NEEDED (MUSCLE PAIN).             metoprolol tartrate (LOPRESSOR) 50 MG tablet  Take 50 mg by mouth 2 (two) times daily.                       _________________________________  Denver Bolton DO  07/11/2020

## 2020-07-12 ENCOUNTER — HOSPITAL ENCOUNTER (EMERGENCY)
Facility: HOSPITAL | Age: 85
Discharge: HOME | End: 2020-07-12
Attending: EMERGENCY MEDICINE
Payer: MEDICARE

## 2020-07-12 VITALS
SYSTOLIC BLOOD PRESSURE: 172 MMHG | BODY MASS INDEX: 21.01 KG/M2 | HEART RATE: 88 BPM | WEIGHT: 107 LBS | HEIGHT: 60 IN | OXYGEN SATURATION: 98 % | DIASTOLIC BLOOD PRESSURE: 82 MMHG | RESPIRATION RATE: 20 BRPM | TEMPERATURE: 98 F

## 2020-07-12 DIAGNOSIS — R06.02 SHORTNESS OF BREATH: Primary | ICD-10-CM

## 2020-07-12 DIAGNOSIS — Z99.81 HISTORY OF HOME OXYGEN THERAPY: ICD-10-CM

## 2020-07-12 DIAGNOSIS — I50.9 CONGESTIVE HEART FAILURE, UNSPECIFIED HF CHRONICITY, UNSPECIFIED HEART FAILURE TYPE: Primary | ICD-10-CM

## 2020-07-12 LAB
ALBUMIN SERPL BCP-MCNC: 3.3 G/DL (ref 3.5–5.2)
ALP SERPL-CCNC: 138 U/L (ref 55–135)
ALT SERPL W/O P-5'-P-CCNC: 16 U/L (ref 10–44)
ANION GAP SERPL CALC-SCNC: 11 MMOL/L (ref 8–16)
ANISOCYTOSIS BLD QL SMEAR: ABNORMAL
AST SERPL-CCNC: 36 U/L (ref 10–40)
BASOPHILS # BLD AUTO: 0.03 K/UL (ref 0–0.2)
BASOPHILS NFR BLD: 0.4 % (ref 0–1.9)
BILIRUB SERPL-MCNC: 2 MG/DL (ref 0.1–1)
BNP SERPL-MCNC: 918 PG/ML (ref 0–99)
BUN SERPL-MCNC: 30 MG/DL (ref 8–23)
BURR CELLS BLD QL SMEAR: ABNORMAL
CALCIUM SERPL-MCNC: 8.9 MG/DL (ref 8.7–10.5)
CHLORIDE SERPL-SCNC: 97 MMOL/L (ref 95–110)
CO2 SERPL-SCNC: 32 MMOL/L (ref 23–29)
CREAT SERPL-MCNC: 1.4 MG/DL (ref 0.5–1.4)
DACRYOCYTES BLD QL SMEAR: ABNORMAL
DIFFERENTIAL METHOD: ABNORMAL
EOSINOPHIL # BLD AUTO: 0.1 K/UL (ref 0–0.5)
EOSINOPHIL NFR BLD: 1.5 % (ref 0–8)
ERYTHROCYTE [DISTWIDTH] IN BLOOD BY AUTOMATED COUNT: 16 % (ref 11.5–14.5)
EST. GFR  (AFRICAN AMERICAN): 39 ML/MIN/1.73 M^2
EST. GFR  (NON AFRICAN AMERICAN): 34 ML/MIN/1.73 M^2
GIANT PLATELETS BLD QL SMEAR: PRESENT
GLUCOSE SERPL-MCNC: 108 MG/DL (ref 70–110)
HCT VFR BLD AUTO: 34.3 % (ref 37–48.5)
HGB BLD-MCNC: 10.6 G/DL (ref 12–16)
IMM GRANULOCYTES # BLD AUTO: 0.04 K/UL (ref 0–0.04)
IMM GRANULOCYTES NFR BLD AUTO: 0.5 % (ref 0–0.5)
LYMPHOCYTES # BLD AUTO: 0.4 K/UL (ref 1–4.8)
LYMPHOCYTES NFR BLD: 4.8 % (ref 18–48)
MCH RBC QN AUTO: 32.8 PG (ref 27–31)
MCHC RBC AUTO-ENTMCNC: 30.9 G/DL (ref 32–36)
MCV RBC AUTO: 106 FL (ref 82–98)
MONOCYTES # BLD AUTO: 1.5 K/UL (ref 0.3–1)
MONOCYTES NFR BLD: 18.5 % (ref 4–15)
NEUTROPHILS # BLD AUTO: 6 K/UL (ref 1.8–7.7)
NEUTROPHILS NFR BLD: 74.3 % (ref 38–73)
NRBC BLD-RTO: 0 /100 WBC
OVALOCYTES BLD QL SMEAR: ABNORMAL
PLATELET # BLD AUTO: 170 K/UL (ref 150–350)
PLATELET BLD QL SMEAR: ABNORMAL
PMV BLD AUTO: 11.1 FL (ref 9.2–12.9)
POIKILOCYTOSIS BLD QL SMEAR: ABNORMAL
POLYCHROMASIA BLD QL SMEAR: ABNORMAL
POTASSIUM SERPL-SCNC: 4.5 MMOL/L (ref 3.5–5.1)
PROT SERPL-MCNC: 6.9 G/DL (ref 6–8.4)
RBC # BLD AUTO: 3.23 M/UL (ref 4–5.4)
SODIUM SERPL-SCNC: 140 MMOL/L (ref 136–145)
TROPONIN I SERPL DL<=0.01 NG/ML-MCNC: 0.05 NG/ML (ref 0–0.03)
WBC # BLD AUTO: 8.09 K/UL (ref 3.9–12.7)

## 2020-07-12 PROCEDURE — 93010 EKG 12-LEAD: ICD-10-PCS | Mod: ,,, | Performed by: INTERNAL MEDICINE

## 2020-07-12 PROCEDURE — 93005 ELECTROCARDIOGRAM TRACING: CPT

## 2020-07-12 PROCEDURE — 93010 ELECTROCARDIOGRAM REPORT: CPT | Mod: ,,, | Performed by: INTERNAL MEDICINE

## 2020-07-12 PROCEDURE — 80053 COMPREHEN METABOLIC PANEL: CPT

## 2020-07-12 PROCEDURE — 99285 EMERGENCY DEPT VISIT HI MDM: CPT | Mod: 25

## 2020-07-12 PROCEDURE — 84484 ASSAY OF TROPONIN QUANT: CPT

## 2020-07-12 PROCEDURE — 36000 PLACE NEEDLE IN VEIN: CPT

## 2020-07-12 PROCEDURE — 83880 ASSAY OF NATRIURETIC PEPTIDE: CPT

## 2020-07-12 PROCEDURE — 85025 COMPLETE CBC W/AUTO DIFF WBC: CPT

## 2020-07-12 NOTE — ED PROVIDER NOTES
"Encounter Date: 7/12/2020       History     Chief Complaint   Patient presents with    Shortness of Breath     pt presents to ED today c/o recurring SOB pt reports she was advised by home health nurse to come to ED for rx of home 02. pt in no acute distress. family reports hx of CHF      88-year-old female recently discharged from the hospital returns for shortness of breath.  Patient was admitted last week for similar symptoms, was diuresed and improved yesterday prior to discharge.  Son notes that at home last night he noticed that she was having some increased work of breathing.  The home health aide today checked her oxygen level and "at 1st it was 56%." However then it went up to the mid 80% level and then back down to the mid 70% level.  On arrival, she has some minimally elevated respiratory rate, and no respiratory distress, with an oxygen saturation in the upper 80s.  Denies any pain or cough or fever.        Review of patient's allergies indicates:  No Known Allergies  Past Medical History:   Diagnosis Date    Breast cancer     Hypertension     TIA (transient ischemic attack)      Past Surgical History:   Procedure Laterality Date    BREAST SURGERY      HIP SURGERY       Family History   Problem Relation Age of Onset    No Known Problems Mother     No Known Problems Father      Social History     Tobacco Use    Smoking status: Never Smoker    Smokeless tobacco: Never Used   Substance Use Topics    Alcohol use: Yes    Drug use: No     Review of Systems   Constitutional: Negative for chills, fatigue and fever.   HENT: Negative for congestion.    Respiratory: Positive for shortness of breath. Negative for cough.    Cardiovascular: Negative for chest pain.   Gastrointestinal: Negative for abdominal pain, diarrhea and vomiting.   Musculoskeletal: Negative for back pain, neck pain and neck stiffness.   Neurological: Negative for light-headedness, numbness and headaches.       Physical Exam "     Initial Vitals [07/12/20 1335]   BP Pulse Resp Temp SpO2   135/63 101 (!) 24 98 °F (36.7 °C) (!) 87 %      MAP       --         Physical Exam    Nursing note and vitals reviewed.  Constitutional: She appears well-developed and well-nourished. No distress.   HENT:   Head: Normocephalic and atraumatic.   Eyes: Conjunctivae and EOM are normal. Pupils are equal, round, and reactive to light.   Neck: Normal range of motion. Neck supple. No tracheal deviation present.   Cardiovascular: Normal rate and intact distal pulses.   Pulmonary/Chest: No respiratory distress.   Musculoskeletal: Normal range of motion. Edema (Trace pitting edema B/L) present. No tenderness.   Neurological: She is alert. She has normal strength. No cranial nerve deficit. GCS eye subscore is 4. GCS verbal subscore is 5. GCS motor subscore is 6.   Skin: Skin is warm and dry.         ED Course   Procedures  Labs Reviewed   CBC W/ AUTO DIFFERENTIAL - Abnormal; Notable for the following components:       Result Value    RBC 3.23 (*)     Hemoglobin 10.6 (*)     Hematocrit 34.3 (*)     Mean Corpuscular Volume 106 (*)     Mean Corpuscular Hemoglobin 32.8 (*)     Mean Corpuscular Hemoglobin Conc 30.9 (*)     RDW 16.0 (*)     Lymph # 0.4 (*)     Mono # 1.5 (*)     Gran% 74.3 (*)     Lymph% 4.8 (*)     Mono% 18.5 (*)     All other components within normal limits   COMPREHENSIVE METABOLIC PANEL - Abnormal; Notable for the following components:    CO2 32 (*)     BUN, Bld 30 (*)     Albumin 3.3 (*)     Total Bilirubin 2.0 (*)     Alkaline Phosphatase 138 (*)     eGFR if  39 (*)     eGFR if non  34 (*)     All other components within normal limits   TROPONIN I - Abnormal; Notable for the following components:    Troponin I 0.053 (*)     All other components within normal limits   B-TYPE NATRIURETIC PEPTIDE - Abnormal; Notable for the following components:     (*)     All other components within normal limits     EKG  Readings: (Independently Interpreted)   Initial Reading: No STEMI. Previous EKG: Compared with most recent EKG Previous EKG Date: 7/9/2020 (Minimal change). Rhythm: Atrial Fibrillation. Heart Rate: 85. Ectopy: No Ectopy. ST Segments: Normal ST Segments. Axis: Normal.       Imaging Results          X-Ray Chest AP Portable (Final result)  Result time 07/12/20 15:09:30    Final result by Fabian Camargo MD (07/12/20 15:09:30)                 Impression:      Increase interstitial opacities, suggestive of worsening pulmonary edema.    Stable bilateral pleural effusions along with cardiomegaly.      Electronically signed by: Fabian Camargo MD  Date:    07/12/2020  Time:    15:09             Narrative:    EXAMINATION:  XR CHEST AP PORTABLE    CLINICAL HISTORY:  Shortness of breath;    TECHNIQUE:  Single frontal view of the chest was performed.    COMPARISON:  07/09/2020.    FINDINGS:  Monitoring EKG leads are present.  The trachea is unremarkable.  There are calcifications of the aortic knob.  There is stable enlargement of the cardiomediastinal silhouette.  There is persistent obscuration of both hemidiaphragms.  There are unchanged bilateral pleural effusions.  There is no evidence of a pneumothorax.  There is no evidence of pneumomediastinum.  There is increase in the degree of interstitial airspace opacities.  There are degenerative changes in the osseous structures.                                 Medical Decision Making:   Initial Assessment:   88-year-old female returns to the emergency department for shortness of breath  ED Management:  Discussed the case with Dr. Bolton, and I agree that this patient who prefers to be at home and has history of congestive heart failure would be best managed with home oxygen an outpatient management.  I have discussed the case with case management who will review the patient's chart and attempt to help to arrange home oxygen for this patient.  Patient handed off to Dr. Mejia for  f/u of pending labs and CXR and further mgmt/disposition.     ================  - care of pt assumed by self, from Dr. Herrera, as of shift change  - pt reportedly without home O2   - pt with improvement of O2 saturation on supplemental O2; no respiratory distress  - labs notable for BNP of 918, troponin of 0.053 (which appears to be pt's baseline)  - CXR demonstrates an increase interstitial opacities, suggestive of worsening pulmonary edema compared to previous CXR; stable pleural effusions bilaterally, cardiomegaly   - discussed case with Dr. Manzano, case management; will get pt home O2 and equipment prior to discharge  - pt stable for discharge to home in care of son                                   Clinical Impression:       ICD-10-CM ICD-9-CM   1. History of home oxygen therapy  Z99.81 V46.2   2. Shortness of breath  R06.02 786.05                                Tariq Mejia MD  07/12/20 2508

## 2020-07-12 NOTE — PROGRESS NOTES
Tn received call from Dr Herrera in the ED. Pt was d/c yesterday by Dr Bolton. Pt now in ED with O2 sats on RA at rest of 87%, per Abdullahi Martell RN- pt's ED nurse.    TN contacted Dr Bolton to see if he would consider ordering Home O2 based on Pt's sats and recent d/c.    Dr Bolton placed DME: Home O2 order.     TN contacted [facility], Mr Vega [phone], fax [phone]. Mr Vega gave TN OK to pull O2 tank set up from [facility]. Mr Vega will deliver the Home O2 concentrator to pt here in the ED prior to leaving.

## 2020-07-13 ENCOUNTER — PATIENT OUTREACH (OUTPATIENT)
Dept: ADMINISTRATIVE | Facility: CLINIC | Age: 85
End: 2020-07-13

## 2020-07-13 DIAGNOSIS — S22.000A COMPRESSION FRACTURE OF BODY OF THORACIC VERTEBRA: ICD-10-CM

## 2020-07-13 DIAGNOSIS — J96.01 ACUTE RESPIRATORY FAILURE WITH HYPOXIA: Primary | ICD-10-CM

## 2020-07-13 NOTE — PATIENT INSTRUCTIONS
"Dyspnea (Shortness Of Breath)  Shortness of Breath (also known as "Dyspnea") is the sense that you can't catch your breath or can't get enough air.  Dyspnea can be caused by many different conditions such as:   Acute asthma attack   Worsening of emphysema (also called "COPD") -- a lung disease that is caused by smoking   A mucus plug blocks a large air passage in the lung -- this can occur with emphysema or chronic bronchitis   Congestive Heart Failure ("CHF") -- when a weak heart muscle allows excess fluid to collect in the lungs   Panic attacks, anxiety -- fear can cause rapid breathing ("hyperventilation")   Pneumonia -- infection in the lung tissue   Exposure to toxic fumes or smoke   Pulmonary embolus (blood clot to the lung)  Based on your visit today, the exact cause of your shortness of breath is not certain. Your tests do not show any of the serious causes of dyspnea. Sometimes, further testing is needed to find out if a serious problem exists. Therefore, it is important for you to watch for any new symptoms or worsening of your condition and follow up with your doctor as directed.  Home Care:   When your symptoms are better, resume your usual activities.   If you smoke, you need to stop. Join a stop-smoking program or ask your doctor for help.  Follow Up  with your doctor or as advised by our staff.  Get Prompt Medical Attention  if any of the following occur:   Increasing shortness of breath or wheezing   Redness, pain or swelling in one leg   Swelling in both legs or ankles   Unexpected weight gain   Chest, arm, shoulder, neck or upper back pain   Dizziness, weakness or fainting   Palpitations (the sense that your heart is fluttering, beating fast or hard)   Fever of 100.4ºF (38ºC) or higher, or as directed by your healthcare provider   Cough with dark colored or bloody sputum (mucus)  © 7798-8813 [employer], [address], [place] [address]. All rights reserved. This " information is not intended as a substitute for professional medical care. Always follow your healthcare professional's instructions.

## 2020-07-15 LAB
BACTERIA BLD CULT: NORMAL
BACTERIA BLD CULT: NORMAL

## 2020-07-17 DIAGNOSIS — R06.02 SOB (SHORTNESS OF BREATH): Primary | ICD-10-CM
